# Patient Record
Sex: FEMALE | Race: WHITE | NOT HISPANIC OR LATINO | Employment: UNEMPLOYED | URBAN - NONMETROPOLITAN AREA
[De-identification: names, ages, dates, MRNs, and addresses within clinical notes are randomized per-mention and may not be internally consistent; named-entity substitution may affect disease eponyms.]

---
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
## 2023-01-19 LAB
ABO GROUP BLD: ABNORMAL
BASOPHILS # BLD AUTO: 0 X10E3/UL (ref 0–0.2)
BASOPHILS NFR BLD AUTO: 1 %
BLD GP AB SCN SERPL QL: NEGATIVE
CFDNA.FET/CFDNA.TOTAL SFR FETUS: NORMAL %
CFTR MUT ANL BLD/T: NORMAL
CITATION REF LAB TEST: NORMAL
CLINICAL GENETICS COUNSELING NOTE: ABNORMAL
CLINICAL INFO: ABNORMAL
EOSINOPHIL # BLD AUTO: 0.1 X10E3/UL (ref 0–0.4)
EOSINOPHIL NFR BLD AUTO: 2 %
ERYTHROCYTE [DISTWIDTH] IN BLOOD BY AUTOMATED COUNT: 19.8 % (ref 11.7–15.4)
ETHNIC BACKGROUND STATED: ABNORMAL
FET 13+18+21+X+Y ANEUP PLAS.CFDNA: NEGATIVE
FET CHR 21 TS PLAS.CFDNA QL: NEGATIVE
FET MS X RISK WBC.DNA+CFDNA QL: NOT DETECTED
FET SEX PLAS.CFDNA DOSAGE CFDNA: NORMAL
FET TS 13 RISK PLAS.CFDNA QL: NEGATIVE
FET TS 18 RISK WBC.DNA+CFDNA QL: NEGATIVE
FET X + Y ANEUP RISK PLAS.CFDNA SEQ-IMP: NOT DETECTED
GA EST FROM CONCEPTION DATE: NORMAL D
GESTATIONAL AGE > 9:: YES
HBV SURFACE AG SERPL QL IA: NEGATIVE
HCT VFR BLD AUTO: 29.1 % (ref 34–46.6)
HCV AB S/CO SERPL IA: <0.1 S/CO RATIO (ref 0–0.9)
HGB BLD-MCNC: 8.3 G/DL (ref 11.1–15.9)
HGB S BLD QL SOLY: NEGATIVE
HIV 1+2 AB+HIV1 P24 AG SERPL QL IA: NON REACTIVE
IMM GRANULOCYTES # BLD AUTO: 0 X10E3/UL (ref 0–0.1)
IMM GRANULOCYTES NFR BLD AUTO: 0 %
LAB DIRECTOR NAME PROVIDER: ABNORMAL
LAB DIRECTOR NAME PROVIDER: NORMAL
LAB DIRECTOR NAME PROVIDER: NORMAL
LABORATORY COMMENT REPORT: ABNORMAL
LABORATORY COMMENT REPORT: NORMAL
LABORATORY COMMENT REPORT: NORMAL
LIMITATIONS OF THE TEST: NORMAL
LYMPHOCYTES # BLD AUTO: 1.6 X10E3/UL (ref 0.7–3.1)
LYMPHOCYTES NFR BLD AUTO: 26 %
MCH RBC QN AUTO: 21.7 PG (ref 26.6–33)
MCHC RBC AUTO-ENTMCNC: 28.5 G/DL (ref 31.5–35.7)
MCV RBC AUTO: 76 FL (ref 79–97)
MONOCYTES # BLD AUTO: 0.8 X10E3/UL (ref 0.1–0.9)
MONOCYTES NFR BLD AUTO: 14 %
NEGATIVE PREDICTIVE VALUE: NORMAL
NEUTROPHILS # BLD AUTO: 3.5 X10E3/UL (ref 1.4–7)
NEUTROPHILS NFR BLD AUTO: 57 %
NOTE: NORMAL
PERFORMANCE CHARACTERISTICS: NORMAL
PLATELET # BLD AUTO: 473 X10E3/UL (ref 150–450)
POSITIVE PREDICTIVE VALUE: NORMAL
RBC # BLD AUTO: 3.83 X10E6/UL (ref 3.77–5.28)
REASON FOR REFERRAL (NARRATIVE): ABNORMAL
REF LAB TEST METHOD: ABNORMAL
REF LAB TEST METHOD: NORMAL
RH BLD: POSITIVE
RPR SER QL: NON REACTIVE
RUBV IGG SERPL IA-ACNC: 1.64 INDEX
SMN1 GENE MUT ANL BLD/T: ABNORMAL
SPECIMEN SOURCE: ABNORMAL
TEST PERFORMANCE INFO SPEC: ABNORMAL
TEST PERFORMANCE INFO SPEC: ABNORMAL
TEST PERFORMANCE INFO SPEC: NORMAL
WBC # BLD AUTO: 6.2 X10E3/UL (ref 3.4–10.8)

## 2023-01-26 ENCOUNTER — ROUTINE PRENATAL (OUTPATIENT)
Dept: OBSTETRICS AND GYNECOLOGY | Facility: CLINIC | Age: 18
End: 2023-01-26
Payer: COMMERCIAL

## 2023-01-26 VITALS — WEIGHT: 94 LBS | DIASTOLIC BLOOD PRESSURE: 54 MMHG | SYSTOLIC BLOOD PRESSURE: 102 MMHG

## 2023-01-26 DIAGNOSIS — R11.0 NAUSEA WITHOUT VOMITING: ICD-10-CM

## 2023-01-26 DIAGNOSIS — K21.9 GASTROESOPHAGEAL REFLUX DISEASE WITHOUT ESOPHAGITIS: ICD-10-CM

## 2023-01-26 DIAGNOSIS — D50.9 IRON DEFICIENCY ANEMIA DURING PREGNANCY: ICD-10-CM

## 2023-01-26 DIAGNOSIS — O09.91 ENCOUNTER FOR SUPERVISION OF HIGH RISK PREGNANCY IN FIRST TRIMESTER, ANTEPARTUM: Primary | ICD-10-CM

## 2023-01-26 DIAGNOSIS — K51.911 ULCERATIVE COLITIS WITH RECTAL BLEEDING, UNSPECIFIED LOCATION: ICD-10-CM

## 2023-01-26 DIAGNOSIS — O99.019 IRON DEFICIENCY ANEMIA DURING PREGNANCY: ICD-10-CM

## 2023-01-26 PROCEDURE — 0502F SUBSEQUENT PRENATAL CARE: CPT | Performed by: OBSTETRICS & GYNECOLOGY

## 2023-01-26 RX ORDER — FAMOTIDINE 20 MG/1
20 TABLET, FILM COATED ORAL 2 TIMES DAILY
Qty: 60 TABLET | Refills: 5 | Status: SHIPPED | OUTPATIENT
Start: 2023-01-26 | End: 2023-02-28

## 2023-01-26 RX ORDER — PROMETHAZINE HYDROCHLORIDE 25 MG/1
25 TABLET ORAL EVERY 6 HOURS PRN
Qty: 30 TABLET | Refills: 0 | Status: SHIPPED | OUTPATIENT
Start: 2023-01-26

## 2023-01-27 NOTE — PROGRESS NOTES
Chief Complaint  Routine Prenatal Visit (Patient complains of nausea. )    History of Present Illness:  Ricky is a  currently at 13w4d who presents today with no complaints other than nausea.  The patient reports it is worse at night.  She does have reflux as well.  Does have known ulcerative colitis.  She does see Dr. Zhu.   The patient did have labs last visit as noted.  She does report she is taking her prenatal vitamins and iron supplements.    Exam:  Vitals:  See prenatal flowsheet as noted and reviewed  General: Alert, cooperative, and does not appear in any distress  Abdomen:   See prenatal flowsheet as noted and reviewed    Uterus gravid, non-tender; no palpable masses    No guarding or rebound tenderness  Pelvic:  See prenatal flowsheet as noted and reviewed  Ext:  See prenatal flowsheet as noted and reviewed    Moves extremities well, no cyanosis and no redness  Urine:  See prenatal flowsheet as noted and reviewed    Data Review:  The following data was reviewed by: Kathi Ndiaye MD on 2023:  Prenatal Labs:  Lab Results   Component Value Date    HGB 8.3 (L) 2023    RUBELLAABIGG 1.64 2023    HEPBSAG Negative 2023    ABO A 2023    RH Positive 2023    ABSCRN Negative 2023    OMK6CEV3 Non Reactive 2023    HEPCVIRUSABY <0.1 2023       Initial Prenatal on 2022   Component Date Value   • Amphetamine, Urine Qual 2022 Negative    • Barbiturates Screen, Uri* 2022 Negative    • Benzodiazepine Screen, U* 2022 Negative    • THC Screen, Urine 2022 Negative    • Cocaine Screen, Urine 2022 Negative    • Opiate Screen, Urine 2022 Negative    • Oxycodone/Oxymorphone, U* 2022 Negative    • Phencyclidine (PCP), Uri* 2022 Negative    • Methadone Screen, Urine 2022 Negative    • Propoxyphene Screen 2022 Negative    • Creatinine, Urine 2022 113.1    • pH, UA 2022 5.8    • Please note  12/29/2022 Comment    • Chlamydia trachomatis, N* 12/29/2022 Negative    • Gonococcus by RAZ 12/29/2022 Negative    • Trichomonas vaginosis 12/29/2022 Negative    • Gestation 01/04/2023 Carmen    • Fetal Fraction 01/04/2023 5%    • Gestational Age >9: 01/04/2023 Yes    • Result 01/04/2023 Negative    •  Comments 01/04/2023 Comment    • Approved By 01/04/2023 Comment    • TRISOMY 21 (DOWN SYNDROM* 01/04/2023 Negative    • TRISOMY 18 (QUIROGA SYND* 01/04/2023 Negative    • TRISOMY 13 (PATAU SYNDRO* 01/04/2023 Negative    • FETAL SEX 01/04/2023 Comment    • MONOSOMY X (TRUONG SYNDR* 01/04/2023 Not Detected    • XYY (RHODES SYNDROME) 01/04/2023 Not Detected    • XXY (KLINEFELTER SYNDROM* 01/04/2023 Not Detected    • XXX (TRIPLE X SYNDROME) 01/04/2023 Not Detected    • NEGATIVE PREDICTIVE VALUE 01/04/2023 Note    • POSITIVE PREDICTIVE VALUE 01/04/2023 N/A    • About The Test 01/04/2023 Comment    • Test Method 01/04/2023 Comment    • Performance 01/04/2023 Comment    • PERFORMANCE CHARACTERIST* 01/04/2023 Note    • Limitations of the Test 01/04/2023 Comment    • Note 01/04/2023 Comment    • References 01/04/2023 Comment    • Hemoglobin (Hgb) Solubil* 01/04/2023 Negative    • Genetic Counselor: 01/04/2023 Not applicable    • Specimen Type 01/04/2023 Comment    • Ethnicity 01/04/2023 Comment    • Indication: 01/04/2023 Comment    • Result 01/04/2023 Note (A)    • General Comments: 01/04/2023 Note    • Additional Clinic Info 01/04/2023 Note    • Method/Limitations: 01/04/2023 Note    • Information Table 01/04/2023 Note    • Disclaimer: 01/04/2023 Note    • Director Review: 01/04/2023 Note    • CF, Screen 01/04/2023 Comment:    • Comment 01/04/2023 Comment    • Hepatitis B Surface Ag 01/04/2023 Negative    • Hep C Virus Ab 01/04/2023 <0.1    • RPR 01/04/2023 Non Reactive    • Rubella Antibodies, IgG 01/04/2023 1.64    • ABO Type 01/04/2023 A    • Rh Factor 01/04/2023 Positive    • Antibody Screen 01/04/2023  Negative    • HIV Screen 4th Gen w/RFX* 01/04/2023 Non Reactive    • WBC 01/04/2023 6.2    • RBC 01/04/2023 3.83    • Hemoglobin 01/04/2023 8.3 (L)    • Hematocrit 01/04/2023 29.1 (L)    • MCV 01/04/2023 76 (L)    • MCH 01/04/2023 21.7 (L)    • MCHC 01/04/2023 28.5 (L)    • RDW 01/04/2023 19.8 (H)    • Platelets 01/04/2023 473 (H)    • Neutrophil Rel % 01/04/2023 57    • Lymphocyte Rel % 01/04/2023 26    • Monocyte Rel % 01/04/2023 14    • Eosinophil Rel % 01/04/2023 2    • Basophil Rel % 01/04/2023 1    • Neutrophils Absolute 01/04/2023 3.5    • Lymphocytes Absolute 01/04/2023 1.6    • Monocytes Absolute 01/04/2023 0.8    • Eosinophils Absolute 01/04/2023 0.1    • Basophils Absolute 01/04/2023 0.0    • Immature Granulocyte Rel* 01/04/2023 0    • Immature Grans Absolute 01/04/2023 0.0      Imaging:  US Ob Transvaginal  Impression:   IUP measuring 9w4d with appropriate fetal cardiac activity.  TARAN is set at   7/31/23.  The cervix and bilateral ovaries are normal in appearance. No   free fluid in the cul-de-sac.    Lola Morgan MD   Obstetrics and Gynecology  [facility]        Medical Records:  None    Assessment and Plan:  Problem List Items Addressed This Visit    None  Visit Diagnoses     Encounter for supervision of high risk pregnancy in first trimester, antepartum    -  Primary  Topics discussed:     ab precautions  GERD management  iron supplementation  kick counts and fetal movement  I discussed with the patient the option of MSAFP.  Patient is to consider as discussed.    Ulcerative colitis with rectal bleeding, unspecified location (HCC)      Patient is to continue her iron supplements.  Prescription is also given for Pepcid for her reflux.  Prescriptions also given for Phenergan.  She is to keep her follow-up appointment with GI as discussed.    Relevant Medications    famotidine (PEPCID) 20 MG tablet    Nausea without vomiting        Relevant Medications    famotidine (PEPCID) 20 MG tablet     promethazine (PHENERGAN) 25 MG tablet    Gastroesophageal reflux disease without esophagitis        Relevant Medications    famotidine (PEPCID) 20 MG tablet    Iron deficiency anemia during pregnancy      Patient is to continue her iron supplements as discussed.        Follow Up/Instructions:  Follow up as scheduled.  Patient was given instructions and counseling regarding her condition or for health maintenance advice. Please see specific information pulled into the AVS if appropriate.     Note: Speech recognition transcription software may have been used to dictate portions of this document.  An attempt at proofreading has been made though minor errors in transcription may still be present.    This note was electronically signed.  Kathi Ndiaye M.D.

## 2023-02-27 NOTE — PROGRESS NOTES
Prenatal Care Visit    Subjective   Chief Complaint   Patient presents with   • Routine Prenatal Visit     Patient is having right side chest pain that is inconsistent.      History:   Ricky is a  currently at 18w1d who presents for a prenatal care visit today.    Reports intermittent N/V that has improved over the past week. She reports intermittent right sided chest pain for the past couple of weeks that is exacerbated by movement/ position and breathing. It is sharp in nature but lingers. She reports a longstanding intermittent cough though this does not seem to be associated with the pain. Denies VB, CTX, LOF. Reports (+) FM.     Objective   BP 94/56   Wt 44 kg (97 lb)   LMP 10/24/2022   Physical Exam:  Normal, gestational age-appropriate exam today        Assessment & Plan     1. IUP @ 18w1d  2. Routine care: I have reviewed the prenatal labs and ultrasound(s) today. I have reviewed the most recent prenatal progress note(s). Anatomy sono today - all anatomy cleared as normal. EFW 42%, AC 46%. Vertex, posterior placenta.  3. UC: denies history of fistula/ abscess. Maintained on budesonide QD and vedolizumab q8 wk infusions. Scheduled to see Dr. Mario 4/3/23.  4. Anemia: H/H 8.3/29.1, encouraged to continue iron supplement. Scheduled for iron infusions in [place].   5. SMA carrier: partner is s/p screening and was negative  6. Chest pain: likely MSK in nature, discussed warning signs/ symptoms and encouraged to report to ED with concerns     Diagnosis Plan   1. Supervision of high risk pregnancy in second trimester        2. Encounter for fetal anatomic survey  US Ob 14 + Weeks Single or First Gestation      3. Ulcerative colitis with rectal bleeding, unspecified location (HCC)        4. Iron deficiency anemia during pregnancy        5. Carrier of spinal muscular atrophy           Medication Management: Continue iron/ iron infusions, PNV    Topics discussed: Prenatal care milestones  Iron  supplementation  Kick counts and fetal movement   labor signs and symptoms   Tests next visit: none   Next visit: 4 week(s)     Lola Morgan MD  Obstetrics and Gynecology  [facility]

## 2023-02-28 ENCOUNTER — ROUTINE PRENATAL (OUTPATIENT)
Dept: OBSTETRICS AND GYNECOLOGY | Facility: CLINIC | Age: 18
End: 2023-02-28
Payer: COMMERCIAL

## 2023-02-28 VITALS — DIASTOLIC BLOOD PRESSURE: 56 MMHG | SYSTOLIC BLOOD PRESSURE: 94 MMHG | WEIGHT: 97 LBS

## 2023-02-28 DIAGNOSIS — O99.019 IRON DEFICIENCY ANEMIA DURING PREGNANCY: ICD-10-CM

## 2023-02-28 DIAGNOSIS — O09.92 SUPERVISION OF HIGH RISK PREGNANCY IN SECOND TRIMESTER: Primary | ICD-10-CM

## 2023-02-28 DIAGNOSIS — Z14.8 CARRIER OF SPINAL MUSCULAR ATROPHY: ICD-10-CM

## 2023-02-28 DIAGNOSIS — D50.9 IRON DEFICIENCY ANEMIA DURING PREGNANCY: ICD-10-CM

## 2023-02-28 DIAGNOSIS — K51.911 ULCERATIVE COLITIS WITH RECTAL BLEEDING, UNSPECIFIED LOCATION: ICD-10-CM

## 2023-02-28 DIAGNOSIS — Z36.89 ENCOUNTER FOR FETAL ANATOMIC SURVEY: ICD-10-CM

## 2023-02-28 PROCEDURE — 0502F SUBSEQUENT PRENATAL CARE: CPT | Performed by: STUDENT IN AN ORGANIZED HEALTH CARE EDUCATION/TRAINING PROGRAM

## 2023-03-30 ENCOUNTER — ROUTINE PRENATAL (OUTPATIENT)
Dept: OBSTETRICS AND GYNECOLOGY | Facility: CLINIC | Age: 18
End: 2023-03-30
Payer: COMMERCIAL

## 2023-03-30 VITALS — WEIGHT: 103 LBS | SYSTOLIC BLOOD PRESSURE: 100 MMHG | DIASTOLIC BLOOD PRESSURE: 58 MMHG

## 2023-03-30 DIAGNOSIS — O09.899 HIGH RISK TEEN PREGNANCY, ANTEPARTUM: ICD-10-CM

## 2023-03-30 DIAGNOSIS — Z34.92 PRENATAL CARE IN SECOND TRIMESTER: Primary | ICD-10-CM

## 2023-03-30 DIAGNOSIS — D64.9 ANEMIA, UNSPECIFIED TYPE: ICD-10-CM

## 2023-03-30 DIAGNOSIS — K51.819 OTHER ULCERATIVE COLITIS WITH COMPLICATION: ICD-10-CM

## 2023-03-30 PROCEDURE — 0502F SUBSEQUENT PRENATAL CARE: CPT | Performed by: OBSTETRICS & GYNECOLOGY

## 2023-03-30 RX ORDER — FERROUS SULFATE 325(65) MG
325 TABLET ORAL 2 TIMES DAILY WITH MEALS
Qty: 60 TABLET | Refills: 5 | Status: SHIPPED | OUTPATIENT
Start: 2023-03-30

## 2023-04-03 PROBLEM — K51.90 ULCERATIVE COLITIS: Status: ACTIVE | Noted: 2023-04-03

## 2023-04-03 PROBLEM — D64.9 ANEMIA: Status: ACTIVE | Noted: 2023-04-03

## 2023-04-03 PROBLEM — O09.899 HIGH RISK TEEN PREGNANCY, ANTEPARTUM: Status: ACTIVE | Noted: 2023-04-03

## 2023-04-03 NOTE — PROGRESS NOTES
Prenatal Care Visit    Subjective   Chief Complaint   Patient presents with   • Routine Prenatal Visit     Abdominal pain, concerned about iron being too low.       History:   Ricky is a  currently at 22w3d who presents for a prenatal care visit today.    No major issues.    Social History    Tobacco Use      Smoking status: Former        Types: Electronic Cigarette      Smokeless tobacco: Never      Tobacco comments: nicotine- i vaped for about a year straight, then i quit right away when i found out i was pregnant       Objective   /58   Wt 46.7 kg (103 lb)   LMP 10/24/2022   Physical Exam:  Normal, gestational age-appropriate exam today        Plan   Medical Decision Making:    I have reviewed the prenatal labs and ultrasound(s) today. I have reviewed the most recent prenatal progress note(s).    Diagnosis: Supervision of high risk pregnancy   Ulcerative colitis, stable  SMA carrier, partner is negative  Teen pregnancy  Depression, stable  Anemia   Tests/Orders/Rx today: No orders of the defined types were placed in this encounter.      Medication Management: none     Topics discussed: Prenatal care milestones  Anemia, iron, iron transfusions   Tests next visit: GCT  HgB   Next visit: 4 week(s)     Jac Bourne MD  Obstetrics and Gynecology  [facility]

## 2023-05-04 NOTE — PROGRESS NOTES
Prenatal Care Visit    Subjective   Chief Complaint   Patient presents with   • Routine Prenatal Visit     Patient states she is doing well.      History:   Ricky is a  currently at 27w4d who presents for a prenatal care visit today.    Doing well. Denies CTX, VB, LOF. Reports (+) FM.      Objective   BP 92/60   Wt 49 kg (108 lb)   LMP 10/24/2022   Physical Exam:  Normal, gestational age-appropriate exam today      Assessment & Plan     1. IUP @ 27w4d  2. Routine care: I have reviewed the prenatal labs and ultrasound(s) today. I have reviewed the most recent prenatal progress note(s). CBC, GTT today.  3. UC: maintained on budesonide QD, vedolizumab q8 wk infusions, stable  4. Anemia: has completed 8 iron infusions in [place]. Will f/u CBC today.  5. SMA carrier: partner is s/p screening and was negative     Diagnosis Plan   1. Prenatal care in second trimester  CBC (No Diff)    Gestational Screen 1 Hr ([facility])      2. Ulcerative colitis with rectal bleeding, unspecified location        3. Iron deficiency anemia during pregnancy           Medication Management: Continue PNV, iron supplement    Topics discussed: Prenatal care milestones  Iron supplementation  Kick counts and fetal movement   labor signs and symptoms   Tests next visit: none   Next visit: 3-4 week(s)     Lola Morgan MD  Obstetrics and Gynecology  [facility]

## 2023-05-05 ENCOUNTER — ROUTINE PRENATAL (OUTPATIENT)
Dept: OBSTETRICS AND GYNECOLOGY | Facility: CLINIC | Age: 18
End: 2023-05-05
Payer: COMMERCIAL

## 2023-05-05 VITALS — SYSTOLIC BLOOD PRESSURE: 92 MMHG | DIASTOLIC BLOOD PRESSURE: 60 MMHG | WEIGHT: 108 LBS

## 2023-05-05 DIAGNOSIS — Z34.92 PRENATAL CARE IN SECOND TRIMESTER: Primary | ICD-10-CM

## 2023-05-05 DIAGNOSIS — K51.911 ULCERATIVE COLITIS WITH RECTAL BLEEDING, UNSPECIFIED LOCATION: ICD-10-CM

## 2023-05-05 DIAGNOSIS — D50.9 IRON DEFICIENCY ANEMIA DURING PREGNANCY: ICD-10-CM

## 2023-05-05 DIAGNOSIS — O99.019 IRON DEFICIENCY ANEMIA DURING PREGNANCY: ICD-10-CM

## 2023-05-05 LAB
ERYTHROCYTE [DISTWIDTH] IN BLOOD BY AUTOMATED COUNT: 16.4 % (ref 12.3–15.4)
GLUCOSE 1H P 50 G GLC PO SERPL-MCNC: 69 MG/DL (ref 65–139)
HCT VFR BLD AUTO: 26.5 % (ref 34–46.6)
HGB BLD-MCNC: 8.1 G/DL (ref 12–15.9)
MCH RBC QN AUTO: 22.9 PG (ref 26.6–33)
MCHC RBC AUTO-ENTMCNC: 30.6 G/DL (ref 31.5–35.7)
MCV RBC AUTO: 74.9 FL (ref 79–97)
PLATELET # BLD AUTO: 360 10*3/MM3 (ref 140–450)
RBC # BLD AUTO: 3.54 10*6/MM3 (ref 3.77–5.28)
WBC # BLD AUTO: 6.56 10*3/MM3 (ref 3.4–10.8)

## 2023-06-02 ENCOUNTER — ROUTINE PRENATAL (OUTPATIENT)
Dept: OBSTETRICS AND GYNECOLOGY | Facility: CLINIC | Age: 18
End: 2023-06-02

## 2023-06-02 VITALS — WEIGHT: 109 LBS | SYSTOLIC BLOOD PRESSURE: 106 MMHG | DIASTOLIC BLOOD PRESSURE: 60 MMHG

## 2023-06-02 DIAGNOSIS — Z34.93 PRENATAL CARE IN THIRD TRIMESTER: Primary | ICD-10-CM

## 2023-06-02 DIAGNOSIS — D64.9 ANEMIA, UNSPECIFIED TYPE: ICD-10-CM

## 2023-06-02 DIAGNOSIS — O09.899 HIGH RISK TEEN PREGNANCY, ANTEPARTUM: ICD-10-CM

## 2023-06-02 DIAGNOSIS — K51.819 OTHER ULCERATIVE COLITIS WITH COMPLICATION: ICD-10-CM

## 2023-06-02 PROCEDURE — 0502F SUBSEQUENT PRENATAL CARE: CPT | Performed by: OBSTETRICS & GYNECOLOGY

## 2023-06-02 NOTE — PROGRESS NOTES
Prenatal Care Visit    Subjective   Chief Complaint   Patient presents with   • Routine Prenatal Visit     No Complaints/concerns        History:   Ricky is a  currently at 31w4d who presents for a prenatal care visit today.    No major issues.    Social History    Tobacco Use      Smoking status: Former        Types: Electronic Cigarette      Smokeless tobacco: Never      Tobacco comments: nicotine- i vaped for about a year straight, then i quit right away when i found out i was pregnant       Objective   /60   Wt 49.4 kg (109 lb)   LMP 10/24/2022   Physical Exam:  Normal, gestational age-appropriate exam today        Plan   Medical Decision Making:    I have reviewed the prenatal labs and ultrasound(s) today. I have reviewed the most recent prenatal progress note(s).    Diagnosis: Supervision of high risk pregnancy   Ulcerative colitis, stable  SMA carrier, partner is negative  Teen pregnancy  Depression, stable  Significant anemia   Tests/Orders/Rx today: No orders of the defined types were placed in this encounter.      Medication Management: none     Topics discussed: Prenatal care milestones  kick counts and fetal movement  PIH precautions   labor signs and symptoms  Anemia, iron, iron transfusions, blood transfusion   Tests next visit: U/S for EFW   Next visit: 1 week(s)     Jac Bourne MD  Obstetrics and Gynecology  [facility]

## 2023-06-09 ENCOUNTER — ROUTINE PRENATAL (OUTPATIENT)
Dept: OBSTETRICS AND GYNECOLOGY | Facility: CLINIC | Age: 18
End: 2023-06-09
Payer: COMMERCIAL

## 2023-06-09 VITALS — DIASTOLIC BLOOD PRESSURE: 58 MMHG | WEIGHT: 111 LBS | SYSTOLIC BLOOD PRESSURE: 104 MMHG

## 2023-06-09 DIAGNOSIS — Z34.93 THIRD TRIMESTER PREGNANCY: Primary | ICD-10-CM

## 2023-06-09 PROCEDURE — 0502F SUBSEQUENT PRENATAL CARE: CPT | Performed by: OBSTETRICS & GYNECOLOGY

## 2023-06-09 NOTE — PROGRESS NOTES
Chief Complaint   Patient presents with    Routine Prenatal Visit     Growth scan, no complaints.        HPI:   , 32w4d gestation reports doing well    ROS:  See Prenatal Episode/Flowsheet  /58   Wt 50.3 kg (111 lb)   LMP 10/24/2022      EXAM:  EXTREMITIES:  No swelling-See Prenatal Episode/Flowsheet    ABDOMEN:  FHTs/Movement noted-See Prenatal Episode/Flowsheet    URINE GLUCOSE/PROTEIN:  See Prenatal Episode/Flowsheet    PELVIC EXAM:  See Prenatal Episode/Flowsheet  CV:  Lungs:  GYN:    MDM:    Lab Results   Component Value Date    HGB 7.8 (L) 2023    RUBELLAABIGG 1.64 2023    HEPBSAG Negative 2023    ABO A 2023    RH Positive 2023    ABSCRN Negative 2023    [id number] Non Reactive 2023    HEPCVIRUSABY <0.1 2023       U/S:    1. IUP 32w4d  2. Routine care   3.  Ulcerative colitis: Stable  4.  Reassuring growth ultrasound  5.  Significant longstanding anemia nonresponsive to oral as well as intravenous iron infusions: We will transfuse patient today or early next week 2 units packed red blood cells.

## 2023-06-12 ENCOUNTER — TELEPHONE (OUTPATIENT)
Dept: OBSTETRICS AND GYNECOLOGY | Facility: CLINIC | Age: 18
End: 2023-06-12
Payer: COMMERCIAL

## 2023-06-12 DIAGNOSIS — O99.019 MATERNAL ANEMIA IN PREGNANCY, ANTEPARTUM: Primary | ICD-10-CM

## 2023-06-12 RX ORDER — SODIUM CHLORIDE 9 MG/ML
250 INJECTION, SOLUTION INTRAVENOUS AS NEEDED
Status: CANCELLED | OUTPATIENT
Start: 2023-06-12

## 2023-06-12 RX ORDER — DIPHENHYDRAMINE HCL 25 MG
25 TABLET ORAL ONCE
Status: CANCELLED | OUTPATIENT
Start: 2023-06-12 | End: 2023-06-12

## 2023-06-12 RX ORDER — ACETAMINOPHEN 325 MG/1
650 TABLET ORAL ONCE
Status: CANCELLED | OUTPATIENT
Start: 2023-06-12 | End: 2023-06-12

## 2023-06-12 NOTE — TELEPHONE ENCOUNTER
Patient called today requesting a referral for a blood transferral. Stated Dr Chanel wanted that ordered for patient.

## 2023-06-12 NOTE — TELEPHONE ENCOUNTER
Orders have been placed.  This was post to be set up on Friday and I do not know what happened.  Please call patient and ensure with outpatient transfusion that it is coordinated for sometime this week.  Thank you

## 2023-06-13 ENCOUNTER — HOSPITAL ENCOUNTER (OUTPATIENT)
Facility: HOSPITAL | Age: 18
Discharge: HOME | End: 2023-06-13
Attending: MIDWIFE | Admitting: OBSTETRICS & GYNECOLOGY
Payer: COMMERCIAL

## 2023-06-13 VITALS
WEIGHT: 112 LBS | TEMPERATURE: 97.7 F | SYSTOLIC BLOOD PRESSURE: 105 MMHG | HEIGHT: 62 IN | DIASTOLIC BLOOD PRESSURE: 66 MMHG | OXYGEN SATURATION: 98 % | BODY MASS INDEX: 20.61 KG/M2 | HEART RATE: 90 BPM | RESPIRATION RATE: 18 BRPM

## 2023-06-13 DIAGNOSIS — O99.019 MATERNAL ANEMIA IN PREGNANCY, ANTEPARTUM: ICD-10-CM

## 2023-06-13 LAB
ABO GROUP BLD: NORMAL
ABO GROUP BLD: NORMAL
BLD GP AB SCN SERPL QL: NEGATIVE
RH BLD: POSITIVE
RH BLD: POSITIVE
T&S EXPIRATION DATE: NORMAL

## 2023-06-13 PROCEDURE — P9016 RBC LEUKOCYTES REDUCED: HCPCS

## 2023-06-13 PROCEDURE — 86900 BLOOD TYPING SEROLOGIC ABO: CPT

## 2023-06-13 PROCEDURE — G0463 HOSPITAL OUTPT CLINIC VISIT: HCPCS

## 2023-06-13 PROCEDURE — 63710000001 DIPHENHYDRAMINE PER 50 MG: Performed by: OBSTETRICS & GYNECOLOGY

## 2023-06-13 PROCEDURE — 86900 BLOOD TYPING SEROLOGIC ABO: CPT | Performed by: MIDWIFE

## 2023-06-13 PROCEDURE — 86850 RBC ANTIBODY SCREEN: CPT | Performed by: MIDWIFE

## 2023-06-13 PROCEDURE — 36430 TRANSFUSION BLD/BLD COMPNT: CPT

## 2023-06-13 PROCEDURE — 86901 BLOOD TYPING SEROLOGIC RH(D): CPT

## 2023-06-13 PROCEDURE — 59025 FETAL NON-STRESS TEST: CPT

## 2023-06-13 PROCEDURE — 86901 BLOOD TYPING SEROLOGIC RH(D): CPT | Performed by: MIDWIFE

## 2023-06-13 PROCEDURE — 36415 COLL VENOUS BLD VENIPUNCTURE: CPT | Performed by: MIDWIFE

## 2023-06-13 PROCEDURE — 86920 COMPATIBILITY TEST SPIN: CPT

## 2023-06-13 RX ORDER — ACETAMINOPHEN 325 MG/1
650 TABLET ORAL ONCE
Status: COMPLETED | OUTPATIENT
Start: 2023-06-13 | End: 2023-06-13

## 2023-06-13 RX ORDER — DIPHENHYDRAMINE HCL 25 MG
25 CAPSULE ORAL ONCE
Status: COMPLETED | OUTPATIENT
Start: 2023-06-13 | End: 2023-06-13

## 2023-06-13 RX ORDER — SODIUM CHLORIDE 9 MG/ML
250 INJECTION, SOLUTION INTRAVENOUS AS NEEDED
Status: DISCONTINUED | OUTPATIENT
Start: 2023-06-13 | End: 2023-06-13 | Stop reason: HOSPADM

## 2023-06-13 RX ADMIN — ACETAMINOPHEN 650 MG: 325 TABLET, FILM COATED ORAL at 12:33

## 2023-06-13 RX ADMIN — DIPHENHYDRAMINE HYDROCHLORIDE 25 MG: 25 CAPSULE ORAL at 12:33

## 2023-06-13 NOTE — NON STRESS TEST
Triage Note - Nursing Documentation  Labor and Delivery Admission Log    Ricky Hamilton  : 2005  MRN: [id number]  CSN: [id number]    Date in / Time in:  2023  Time in:       Date out / Time out:    Time out:     Nurse: Allison Dyer, RN    Patient Info: She is a 17 y.o. year old  at 33w1d with an TARAN of 2023, by Last Menstrual Period who was seen on the [facility] [place].    Chief Complaint:   Chief Complaint   Patient presents with    Non-stress Test     SENT FROM OFFICE TO RECEIVE 2 UNITS OF BLOOD       Provider Instructions / Disposition: Discharging home.    Patient Active Problem List   Diagnosis    Anemia    Ulcerative colitis    High risk teen pregnancy, antepartum       NST Documentation (Only applicable > 32 weeks): Interpretation A  Nonstress Test Interpretation A: Reactive (23 1834 : Allison Dyer, RN)

## 2023-06-14 LAB
BH BB BLOOD EXPIRATION DATE: NORMAL
BH BB BLOOD EXPIRATION DATE: NORMAL
BH BB BLOOD TYPE BARCODE: 6200
BH BB BLOOD TYPE BARCODE: 6200
BH BB DISPENSE STATUS: NORMAL
BH BB DISPENSE STATUS: NORMAL
BH BB PRODUCT CODE: NORMAL
BH BB PRODUCT CODE: NORMAL
BH BB UNIT NUMBER: NORMAL
BH BB UNIT NUMBER: NORMAL
CROSSMATCH INTERPRETATION: NORMAL
CROSSMATCH INTERPRETATION: NORMAL
UNIT  ABO: NORMAL
UNIT  ABO: NORMAL
UNIT  RH: NORMAL
UNIT  RH: NORMAL

## 2023-06-16 ENCOUNTER — HOSPITAL ENCOUNTER (OUTPATIENT)
Facility: HOSPITAL | Age: 18
Discharge: HOME | End: 2023-06-16
Attending: OBSTETRICS & GYNECOLOGY | Admitting: OBSTETRICS & GYNECOLOGY
Payer: COMMERCIAL

## 2023-06-16 VITALS
TEMPERATURE: 98.8 F | WEIGHT: 115 LBS | HEART RATE: 81 BPM | BODY MASS INDEX: 21.16 KG/M2 | OXYGEN SATURATION: 97 % | HEIGHT: 62 IN | RESPIRATION RATE: 18 BRPM | DIASTOLIC BLOOD PRESSURE: 60 MMHG | SYSTOLIC BLOOD PRESSURE: 111 MMHG

## 2023-06-16 LAB
BILIRUB BLD-MCNC: NEGATIVE MG/DL
CLARITY, POC: CLEAR
COLOR UR: YELLOW
GLUCOSE UR STRIP-MCNC: NEGATIVE MG/DL
KETONES UR QL: NEGATIVE
LEUKOCYTE EST, POC: NEGATIVE
NITRITE UR-MCNC: NEGATIVE MG/ML
PH UR: 6.5 [PH] (ref 5–8)
PROT UR STRIP-MCNC: NEGATIVE MG/DL
RBC # UR STRIP: NEGATIVE /UL
SP GR UR: 1 (ref 1–1.03)
UROBILINOGEN UR QL: NORMAL

## 2023-06-16 PROCEDURE — 59025 FETAL NON-STRESS TEST: CPT

## 2023-06-16 PROCEDURE — 81002 URINALYSIS NONAUTO W/O SCOPE: CPT | Performed by: OBSTETRICS & GYNECOLOGY

## 2023-06-16 RX ORDER — BUTALBITAL, ACETAMINOPHEN AND CAFFEINE 50; 325; 40 MG/1; MG/1; MG/1
2 TABLET ORAL ONCE
Status: COMPLETED | OUTPATIENT
Start: 2023-06-16 | End: 2023-06-16

## 2023-06-16 RX ADMIN — BUTALBITAL, ACETAMINOPHEN, AND CAFFEINE 2 TABLET: 50; 325; 40 TABLET ORAL at 17:43

## 2023-06-16 NOTE — NON STRESS TEST
Triage Note - Nursing Documentation  Labor and Delivery Admission Log    Ricky Hamilton  : 2005  MRN: [id number]  CSN: [id number]    Date in / Time in:  2023  Time in:      Date out / Time out:    Time out:      Nurse: Jane Spivey RN    Patient Info: She is a 17 y.o. year old  at 33w4d with an TARAN of 2023, by Last Menstrual Period who [facility].    Chief Complaint:   Chief Complaint   Patient presents with    Headache     My headache wont go away.       Provider Instructions / Disposition: Pt arrived for headache, given Fioricet and it relieved headache. Pt   Abdomen soft and non tender, no bleeding, no cramping. Pt stable and ready for d/c to  home. Educated on diet, increasing PO intake,  labor s/s and fetal kick counts. Pt stable and d/c to home.    Patient Active Problem List   Diagnosis    Anemia    Ulcerative colitis    High risk teen pregnancy, antepartum       NST Documentation (Only applicable > 32 weeks): Interpretation A  Nonstress Test Interpretation A: Reactive (23 1715 : Jane Spivey, RN)

## 2023-06-19 ENCOUNTER — ROUTINE PRENATAL (OUTPATIENT)
Dept: OBSTETRICS AND GYNECOLOGY | Facility: CLINIC | Age: 18
End: 2023-06-19
Payer: COMMERCIAL

## 2023-06-19 VITALS — WEIGHT: 111 LBS | DIASTOLIC BLOOD PRESSURE: 68 MMHG | SYSTOLIC BLOOD PRESSURE: 108 MMHG | BODY MASS INDEX: 20.3 KG/M2

## 2023-06-19 DIAGNOSIS — O09.899 HIGH RISK TEEN PREGNANCY, ANTEPARTUM: Primary | ICD-10-CM

## 2023-06-19 PROCEDURE — 0502F SUBSEQUENT PRENATAL CARE: CPT | Performed by: MIDWIFE

## 2023-06-19 NOTE — PROGRESS NOTES
Chief Complaint   Patient presents with    Routine Prenatal Visit     No Complaints/concerns        HPI: Ricky is a  currently at 34w0d here for prenatal visit who reports the following:  Baby is active. She denies any cramping or ctxs. She had a blood transfusion of 2 units ;last week. She returned to  on Friday for headache.                EXAM:     Vitals:    23 1355   BP: 108/68      Abdomen:   See prenatal flowsheet as noted and reviewed, soft, nontender   Pelvic:  See prenatal flowsheet as noted and reviewed   Urine:  See prenatal flowsheet as noted and reviewed    Lab Results   Component Value Date    ABO A 2023    RH Positive 2023    ABSCRN Negative 2023       MDM:  Impression: Anemia in pregnancy   Tests done today: none   Topics discussed: kick counts and fetal movement   labor signs and symptoms  Reviewed OB labs   Tests next visit: GBS testing                RTO:                        2 weeks    This note was electronically signed.  Teresa Najera, ALISIA  2023

## 2023-07-21 ENCOUNTER — HOSPITAL ENCOUNTER (OUTPATIENT)
Facility: HOSPITAL | Age: 18
Discharge: HOME | End: 2023-07-21
Attending: STUDENT IN AN ORGANIZED HEALTH CARE EDUCATION/TRAINING PROGRAM | Admitting: STUDENT IN AN ORGANIZED HEALTH CARE EDUCATION/TRAINING PROGRAM
Payer: COMMERCIAL

## 2023-07-21 VITALS
HEART RATE: 81 BPM | BODY MASS INDEX: 21.35 KG/M2 | RESPIRATION RATE: 16 BRPM | DIASTOLIC BLOOD PRESSURE: 62 MMHG | WEIGHT: 116 LBS | OXYGEN SATURATION: 96 % | TEMPERATURE: 97.8 F | HEIGHT: 62 IN | SYSTOLIC BLOOD PRESSURE: 102 MMHG

## 2023-07-21 PROCEDURE — 81002 URINALYSIS NONAUTO W/O SCOPE: CPT | Performed by: STUDENT IN AN ORGANIZED HEALTH CARE EDUCATION/TRAINING PROGRAM

## 2023-07-21 PROCEDURE — G0463 HOSPITAL OUTPT CLINIC VISIT: HCPCS

## 2023-07-21 PROCEDURE — 59025 FETAL NON-STRESS TEST: CPT | Performed by: STUDENT IN AN ORGANIZED HEALTH CARE EDUCATION/TRAINING PROGRAM

## 2023-07-21 PROCEDURE — 59025 FETAL NON-STRESS TEST: CPT

## 2023-07-21 NOTE — NON STRESS TEST
Triage Note - Nursing Documentation  Labor and Delivery Admission Log    Ricky Hamilton  : 2005  MRN: [id number]  CSN: [id number]    Date in / Time in:  2023  Time in: 1354    Date out / Time out:    Time out: 6811    Nurse: Myah Cody RN    Patient Info: She is a 18 y.o. year old  at 38w4d with an TARAN of 2023, by Last Menstrual Period who was seen on the [facility].    Chief Complaint:   Chief Complaint   Patient presents with    Abdominal Cramping       Provider Instructions / Disposition:     Patient educated on signs of labor and fetal movement. Patient verbalized understanding to instructions and signed. Patient has scheduled appointment on 2023 in OB/GYN office with ALISIA Bowden.  Patient instructed if any change in condition; patient to return to [facility] for assessment/evaluation. Patient discharged home in stable condition. - Myah Cody RN.    Patient Active Problem List   Diagnosis    Anemia    Ulcerative colitis    High risk teen pregnancy, antepartum       NST Documentation (Only applicable > 32 weeks): Interpretation A  Nonstress Test Interpretation A: Reactive (23 1459 : Myah Cody, RN)

## 2023-07-24 ENCOUNTER — ROUTINE PRENATAL (OUTPATIENT)
Dept: OBSTETRICS AND GYNECOLOGY | Facility: CLINIC | Age: 18
End: 2023-07-24
Payer: COMMERCIAL

## 2023-07-24 VITALS — BODY MASS INDEX: 21.4 KG/M2 | SYSTOLIC BLOOD PRESSURE: 110 MMHG | DIASTOLIC BLOOD PRESSURE: 72 MMHG | WEIGHT: 117 LBS

## 2023-07-24 DIAGNOSIS — K51.819 OTHER ULCERATIVE COLITIS WITH COMPLICATION: ICD-10-CM

## 2023-07-24 DIAGNOSIS — O99.019 MATERNAL ANEMIA IN PREGNANCY, ANTEPARTUM: ICD-10-CM

## 2023-07-24 DIAGNOSIS — O47.9 IRREGULAR UTERINE CONTRACTIONS: ICD-10-CM

## 2023-07-24 DIAGNOSIS — O09.93 ENCOUNTER FOR SUPERVISION OF HIGH RISK PREGNANCY IN THIRD TRIMESTER, ANTEPARTUM: Primary | ICD-10-CM

## 2023-07-24 PROCEDURE — 0502F SUBSEQUENT PRENATAL CARE: CPT | Performed by: OBSTETRICS & GYNECOLOGY

## 2023-07-24 NOTE — PROGRESS NOTES
Chief Complaint  Routine Prenatal Visit (No complaints)    History of Present Illness:  Ricky is a  currently at 39w0d who presents today with no significant complaints.  She does report good fetal movement.  She has had occasional contractions.  She denies any vaginal bleeding or leaking any fluid.  She has been taking her prenatal vitamins as well as iron supplements.  She has not had a flareup of her ulcerative colitis.  She is not desiring an induction of labor unless necessary.    Exam:  Vitals:  See prenatal flowsheet as noted and reviewed  General: Alert, cooperative, and does not appear in any distress  Abdomen:   See prenatal flowsheet as noted and reviewed    Uterus gravid, non-tender; no palpable masses    No guarding or rebound tenderness  Pelvic:  See prenatal flowsheet as noted and reviewed    Vaginal exam fingertip/50%/-3  Ext:  See prenatal flowsheet as noted and reviewed    Moves extremities well, no cyanosis and no redness  Urine:  See prenatal flowsheet as noted and reviewed    Data Review:  The following data was reviewed by: Kathi Ndiaye MD on 2023:  Prenatal Labs:  Lab Results   Component Value Date    HGB 10.6 (L) 2023    RUBELLAABIGG 1.64 2023    HEPBSAG Negative 2023    ABO A 2023    RH Positive 2023    ABSCRN Negative 2023    [id number] Non Reactive 2023    HEPCVIRUSABY <0.1 2023    STREPGPB Negative 2023       Admission on 2023, Discharged on 2023   Component Date Value    Color 2023 Yellow     Clarity, UA 2023 Clear     Glucose, UA 2023 Negative     Bilirubin 2023 Negative     Ketones, UA 2023 Negative     Specific Gravity  2023 1.005     Blood, UA 2023 Negative     pH, Urine 2023 6.5     Protein, POC 2023 Negative     Urobilinogen, UA 2023 Normal     Leukocytes 2023 Negative     Nitrite, UA 2023 Negative    Lab on 2023   Component Date  Value    C-Reactive Protein 07/11/2023 0.67 (H)     WBC 07/11/2023 7.52     RBC 07/11/2023 4.41     Hemoglobin 07/11/2023 10.6 (L)     Hematocrit 07/11/2023 32.8 (L)     MCV 07/11/2023 74.4 (L)     MCH 07/11/2023 24.0 (L)     MCHC 07/11/2023 32.3     RDW 07/11/2023 19.8 (H)     RDW-SD 07/11/2023 51.9     MPV 07/11/2023 10.6     Platelets 07/11/2023 301     nRBC 07/11/2023 0.0     Glucose 07/11/2023 73     BUN 07/11/2023 5 (L)     Creatinine 07/11/2023 0.50 (L)     Sodium 07/11/2023 139     Potassium 07/11/2023 3.9     Chloride 07/11/2023 105     CO2 07/11/2023 20.3 (L)     Calcium 07/11/2023 9.0     Total Protein 07/11/2023 6.9     Albumin 07/11/2023 3.4 (L)     ALT (SGPT) 07/11/2023 8     AST (SGOT) 07/11/2023 15     Alkaline Phosphatase 07/11/2023 173 (H)     Total Bilirubin 07/11/2023 0.3     Globulin 07/11/2023 3.5     A/G Ratio 07/11/2023 1.0     BUN/Creatinine Ratio 07/11/2023 10.0     Anion Gap 07/11/2023 13.7     eGFR 07/11/2023 138.8     Neutrophil % 07/11/2023 81.0 (H)     Lymphocyte % 07/11/2023 12.0 (L)     Monocyte % 07/11/2023 6.0     Eosinophil % 07/11/2023 1.0     Neutrophils Absolute 07/11/2023 6.09     Lymphocytes Absolute 07/11/2023 0.90     Monocytes Absolute 07/11/2023 0.45     Eosinophils Absolute 07/11/2023 0.08     RBC Morphology 07/11/2023 Normal     WBC Morphology 07/11/2023 Normal     Platelet Morphology 07/11/2023 Normal    Routine Prenatal on 07/03/2023   Component Date Value    Strep Gp B Culture 07/03/2023 Negative      Imaging:  US Ob Follow Up Transabdominal Approach  Overall growth 52.3 percentile.  MADISON 14.26.  Vertex.  Posterior placenta.    Active fetus.     Medical Records:  None    Assessment and Plan:  Problem List Items Addressed This Visit          Other    Ulcerative colitis     Other Visit Diagnoses       Encounter for supervision of high risk pregnancy in third trimester, antepartum    -  Primary  Topics discussed:     GERD management  induction of labor  iron  supplementation  kick counts and fetal movement  labor signs and symptoms  PIH precautions  Scan for growth next visit if undelivered    Maternal anemia in pregnancy, antepartum      Patient is to continue her iron supplements previously given.    Irregular uterine contractions      Labor precautions and instructions have been given.          Follow Up/Instructions:  Follow up as scheduled.  Patient was given instructions and counseling regarding her condition or for health maintenance advice. Please see specific information pulled into the AVS if appropriate.     Note: Speech recognition transcription software may have been used to dictate portions of this document.  An attempt at proofreading has been made though minor errors in transcription may still be present.    This note was electronically signed.  Kathi Ndiaye M.D.

## 2023-07-29 ENCOUNTER — HOSPITAL ENCOUNTER (OUTPATIENT)
Facility: HOSPITAL | Age: 18
Discharge: HOME | End: 2023-07-29
Attending: MIDWIFE | Admitting: MIDWIFE
Payer: COMMERCIAL

## 2023-07-29 VITALS
OXYGEN SATURATION: 97 % | DIASTOLIC BLOOD PRESSURE: 69 MMHG | WEIGHT: 117.2 LBS | HEART RATE: 77 BPM | SYSTOLIC BLOOD PRESSURE: 91 MMHG | TEMPERATURE: 98.1 F | BODY MASS INDEX: 21.57 KG/M2 | RESPIRATION RATE: 18 BRPM | HEIGHT: 62 IN

## 2023-07-29 LAB
A1 MICROGLOB PLACENTAL VAG QL: NEGATIVE
BILIRUB BLD-MCNC: NEGATIVE MG/DL
CLARITY, POC: CLEAR
COLOR UR: YELLOW
GLUCOSE UR STRIP-MCNC: NEGATIVE MG/DL
KETONES UR QL: NEGATIVE
LEUKOCYTE EST, POC: NEGATIVE
NITRITE UR-MCNC: NEGATIVE MG/ML
PH UR: 6 [PH] (ref 5–8)
PROT UR STRIP-MCNC: NEGATIVE MG/DL
RBC # UR STRIP: NEGATIVE /UL
SP GR UR: 1.01 (ref 1–1.03)
UROBILINOGEN UR QL: NORMAL

## 2023-07-29 PROCEDURE — G0463 HOSPITAL OUTPT CLINIC VISIT: HCPCS

## 2023-07-29 PROCEDURE — 59025 FETAL NON-STRESS TEST: CPT

## 2023-07-29 PROCEDURE — 84112 EVAL AMNIOTIC FLUID PROTEIN: CPT | Performed by: MIDWIFE

## 2023-07-29 PROCEDURE — 81002 URINALYSIS NONAUTO W/O SCOPE: CPT | Performed by: MIDWIFE

## 2023-07-29 NOTE — NON STRESS TEST
Triage Note - Nursing Documentation  Labor and Delivery Admission Log    Ricky Hamilton  : 2005  MRN: [id number]  CSN: [id number]    Date in / Time in:  2023  Time in: 1623    Date out / Time out:    Time out: 1704    Nurse: Myah Cody RN    Patient Info: She is a 18 y.o. year old  at 39w5d with an TARAN of 2023, by Last Menstrual Period who was seen on the [facility].    Chief Complaint:   Chief Complaint   Patient presents with    Rupture of Membranes     Patient thinks her membranes have ruptured       Provider Instructions / Disposition:     Patient educated on fetal movement and signs/symptoms of labor. Patient verbalized understanding to instructions and signed. Patient has scheduled appointment in OB/GYN office on 23. Patient instructed if any change in condition; patient to return to [facility] for assessment/evaluation.  Patient discharged home in stable condition. - Myah oCdy RN.    Patient Active Problem List   Diagnosis    Anemia    Ulcerative colitis    High risk teen pregnancy, antepartum       NST Documentation (Only applicable > 32 weeks): Interpretation A  Nonstress Test Interpretation A: Reactive (23 1703 : Myah Cody, RN)

## 2023-07-31 ENCOUNTER — ROUTINE PRENATAL (OUTPATIENT)
Dept: OBSTETRICS AND GYNECOLOGY | Facility: CLINIC | Age: 18
End: 2023-07-31
Payer: COMMERCIAL

## 2023-07-31 ENCOUNTER — PREP FOR SURGERY (OUTPATIENT)
Dept: OTHER | Facility: HOSPITAL | Age: 18
End: 2023-07-31
Payer: COMMERCIAL

## 2023-07-31 VITALS — WEIGHT: 116 LBS | SYSTOLIC BLOOD PRESSURE: 104 MMHG | BODY MASS INDEX: 21.22 KG/M2 | DIASTOLIC BLOOD PRESSURE: 60 MMHG

## 2023-07-31 DIAGNOSIS — O99.019 MATERNAL ANEMIA IN PREGNANCY, ANTEPARTUM: Primary | ICD-10-CM

## 2023-07-31 DIAGNOSIS — Z34.90 ENCOUNTER FOR INDUCTION OF LABOR: Primary | ICD-10-CM

## 2023-07-31 RX ORDER — OXYTOCIN/0.9 % SODIUM CHLORIDE 30/500 ML
999 PLASTIC BAG, INJECTION (ML) INTRAVENOUS ONCE
Status: CANCELLED | OUTPATIENT
Start: 2023-07-31 | End: 2023-07-31

## 2023-07-31 RX ORDER — PROMETHAZINE HYDROCHLORIDE 12.5 MG/1
12.5 TABLET ORAL EVERY 6 HOURS PRN
Status: CANCELLED | OUTPATIENT
Start: 2023-07-31

## 2023-07-31 RX ORDER — HYDROXYZINE HYDROCHLORIDE 25 MG/1
50 TABLET, FILM COATED ORAL NIGHTLY PRN
Status: CANCELLED | OUTPATIENT
Start: 2023-07-31

## 2023-07-31 RX ORDER — SODIUM CHLORIDE, SODIUM LACTATE, POTASSIUM CHLORIDE, CALCIUM CHLORIDE 600; 310; 30; 20 MG/100ML; MG/100ML; MG/100ML; MG/100ML
125 INJECTION, SOLUTION INTRAVENOUS CONTINUOUS
Status: CANCELLED | OUTPATIENT
Start: 2023-07-31

## 2023-07-31 RX ORDER — ONDANSETRON 4 MG/1
4 TABLET, FILM COATED ORAL EVERY 6 HOURS PRN
Status: CANCELLED | OUTPATIENT
Start: 2023-07-31

## 2023-07-31 RX ORDER — ACETAMINOPHEN 325 MG/1
650 TABLET ORAL ONCE AS NEEDED
Status: CANCELLED | OUTPATIENT
Start: 2023-07-31

## 2023-07-31 RX ORDER — HYDROCODONE BITARTRATE AND ACETAMINOPHEN 5; 325 MG/1; MG/1
1 TABLET ORAL EVERY 4 HOURS PRN
Status: CANCELLED | OUTPATIENT
Start: 2023-07-31 | End: 2023-08-07

## 2023-07-31 RX ORDER — OXYTOCIN/0.9 % SODIUM CHLORIDE 30/500 ML
250 PLASTIC BAG, INJECTION (ML) INTRAVENOUS CONTINUOUS
Status: CANCELLED | OUTPATIENT
Start: 2023-07-31 | End: 2023-07-31

## 2023-07-31 RX ORDER — MISOPROSTOL 200 UG/1
800 TABLET ORAL ONCE AS NEEDED
Status: CANCELLED | OUTPATIENT
Start: 2023-07-31 | End: 2023-08-01

## 2023-07-31 RX ORDER — OXYTOCIN/0.9 % SODIUM CHLORIDE 30/500 ML
1-20 PLASTIC BAG, INJECTION (ML) INTRAVENOUS
Status: CANCELLED | OUTPATIENT
Start: 2023-07-31

## 2023-07-31 RX ORDER — MORPHINE SULFATE 2 MG/ML
2 INJECTION, SOLUTION INTRAMUSCULAR; INTRAVENOUS ONCE AS NEEDED
Status: CANCELLED | OUTPATIENT
Start: 2023-07-31

## 2023-07-31 RX ORDER — PROMETHAZINE HYDROCHLORIDE 12.5 MG/1
12.5 SUPPOSITORY RECTAL EVERY 6 HOURS PRN
Status: CANCELLED | OUTPATIENT
Start: 2023-07-31

## 2023-07-31 RX ORDER — MAGNESIUM CARB/ALUMINUM HYDROX 105-160MG
30 TABLET,CHEWABLE ORAL ONCE
Status: CANCELLED | OUTPATIENT
Start: 2023-07-31 | End: 2023-07-31

## 2023-07-31 RX ORDER — ONDANSETRON 2 MG/ML
4 INJECTION INTRAMUSCULAR; INTRAVENOUS ONCE AS NEEDED
Status: CANCELLED | OUTPATIENT
Start: 2023-07-31

## 2023-07-31 RX ORDER — ONDANSETRON 2 MG/ML
4 INJECTION INTRAMUSCULAR; INTRAVENOUS EVERY 6 HOURS PRN
Status: CANCELLED | OUTPATIENT
Start: 2023-07-31

## 2023-07-31 RX ORDER — ONDANSETRON 4 MG/1
4 TABLET, FILM COATED ORAL ONCE AS NEEDED
Status: CANCELLED | OUTPATIENT
Start: 2023-07-31

## 2023-07-31 RX ORDER — CARBOPROST TROMETHAMINE 250 UG/ML
250 INJECTION, SOLUTION INTRAMUSCULAR ONCE AS NEEDED
Status: CANCELLED | OUTPATIENT
Start: 2023-07-31 | End: 2023-08-01

## 2023-07-31 RX ORDER — METHYLERGONOVINE MALEATE 0.2 MG/ML
200 INJECTION INTRAVENOUS ONCE AS NEEDED
Status: CANCELLED | OUTPATIENT
Start: 2023-07-31 | End: 2023-08-01

## 2023-08-02 ENCOUNTER — HOSPITAL ENCOUNTER (OUTPATIENT)
Dept: LABOR AND DELIVERY | Facility: HOSPITAL | Age: 18
Discharge: HOME | End: 2023-08-02
Payer: COMMERCIAL

## 2023-08-02 ENCOUNTER — HOSPITAL ENCOUNTER (INPATIENT)
Facility: HOSPITAL | Age: 18
LOS: 3 days | Discharge: HOME | End: 2023-08-05
Attending: OBSTETRICS & GYNECOLOGY | Admitting: MIDWIFE
Payer: COMMERCIAL

## 2023-08-02 DIAGNOSIS — Z34.90 ENCOUNTER FOR INDUCTION OF LABOR: ICD-10-CM

## 2023-08-02 LAB
ABO GROUP BLD: NORMAL
ANISOCYTOSIS BLD QL: NORMAL
BASOPHILS # BLD AUTO: 0.02 10*3/MM3 (ref 0–0.2)
BASOPHILS NFR BLD AUTO: 0.3 % (ref 0–1.5)
BILIRUB BLD-MCNC: NEGATIVE MG/DL
BLD GP AB SCN SERPL QL: NEGATIVE
CLARITY, POC: CLEAR
COLOR UR: YELLOW
DEPRECATED RDW RBC AUTO: 59.3 FL (ref 37–54)
EOSINOPHIL # BLD AUTO: 0.07 10*3/MM3 (ref 0–0.4)
EOSINOPHIL NFR BLD AUTO: 0.9 % (ref 0.3–6.2)
ERYTHROCYTE [DISTWIDTH] IN BLOOD BY AUTOMATED COUNT: 21.9 % (ref 12.3–15.4)
GLUCOSE UR STRIP-MCNC: NEGATIVE MG/DL
HCT VFR BLD AUTO: 32.5 % (ref 34–46.6)
HGB BLD-MCNC: 10.7 G/DL (ref 12–15.9)
IMM GRANULOCYTES # BLD AUTO: 0.01 10*3/MM3 (ref 0–0.05)
IMM GRANULOCYTES NFR BLD AUTO: 0.1 % (ref 0–0.5)
KETONES UR QL: NEGATIVE
LEUKOCYTE EST, POC: ABNORMAL
LYMPHOCYTES # BLD AUTO: 2.05 10*3/MM3 (ref 0.7–3.1)
LYMPHOCYTES NFR BLD AUTO: 25.8 % (ref 19.6–45.3)
MCH RBC QN AUTO: 25.1 PG (ref 26.6–33)
MCHC RBC AUTO-ENTMCNC: 32.9 G/DL (ref 31.5–35.7)
MCV RBC AUTO: 76.1 FL (ref 79–97)
MONOCYTES # BLD AUTO: 0.57 10*3/MM3 (ref 0.1–0.9)
MONOCYTES NFR BLD AUTO: 7.2 % (ref 5–12)
NEUTROPHILS NFR BLD AUTO: 5.22 10*3/MM3 (ref 1.7–7)
NEUTROPHILS NFR BLD AUTO: 65.7 % (ref 42.7–76)
NITRITE UR-MCNC: NEGATIVE MG/ML
NRBC BLD AUTO-RTO: 0 /100 WBC (ref 0–0.2)
PH UR: 6 [PH] (ref 5–8)
PLATELET # BLD AUTO: 300 10*3/MM3 (ref 140–450)
PMV BLD AUTO: 9.6 FL (ref 6–12)
PROT UR STRIP-MCNC: ABNORMAL MG/DL
RBC # BLD AUTO: 4.27 10*6/MM3 (ref 3.77–5.28)
RBC # UR STRIP: NEGATIVE /UL
RH BLD: POSITIVE
SMALL PLATELETS BLD QL SMEAR: ADEQUATE
SP GR UR: 1.02 (ref 1–1.03)
T&S EXPIRATION DATE: NORMAL
UROBILINOGEN UR QL: NORMAL
WBC MORPH BLD: NORMAL
WBC NRBC COR # BLD: 7.94 10*3/MM3 (ref 3.4–10.8)

## 2023-08-02 PROCEDURE — 81002 URINALYSIS NONAUTO W/O SCOPE: CPT | Performed by: MIDWIFE

## 2023-08-02 PROCEDURE — 86850 RBC ANTIBODY SCREEN: CPT | Performed by: MIDWIFE

## 2023-08-02 PROCEDURE — 59025 FETAL NON-STRESS TEST: CPT

## 2023-08-02 PROCEDURE — 86900 BLOOD TYPING SEROLOGIC ABO: CPT | Performed by: MIDWIFE

## 2023-08-02 PROCEDURE — G0463 HOSPITAL OUTPT CLINIC VISIT: HCPCS

## 2023-08-02 PROCEDURE — 59025 FETAL NON-STRESS TEST: CPT | Performed by: OBSTETRICS & GYNECOLOGY

## 2023-08-02 PROCEDURE — 85025 COMPLETE CBC W/AUTO DIFF WBC: CPT | Performed by: MIDWIFE

## 2023-08-02 PROCEDURE — 85007 BL SMEAR W/DIFF WBC COUNT: CPT | Performed by: MIDWIFE

## 2023-08-02 PROCEDURE — 86901 BLOOD TYPING SEROLOGIC RH(D): CPT | Performed by: MIDWIFE

## 2023-08-02 RX ORDER — SODIUM CHLORIDE, SODIUM LACTATE, POTASSIUM CHLORIDE, CALCIUM CHLORIDE 600; 310; 30; 20 MG/100ML; MG/100ML; MG/100ML; MG/100ML
125 INJECTION, SOLUTION INTRAVENOUS CONTINUOUS
Status: DISCONTINUED | OUTPATIENT
Start: 2023-08-02 | End: 2023-08-03

## 2023-08-02 RX ORDER — MAGNESIUM CARB/ALUMINUM HYDROX 105-160MG
30 TABLET,CHEWABLE ORAL ONCE
Status: COMPLETED | OUTPATIENT
Start: 2023-08-02 | End: 2023-08-03

## 2023-08-02 RX ORDER — ONDANSETRON 4 MG/1
4 TABLET, FILM COATED ORAL EVERY 6 HOURS PRN
Status: DISCONTINUED | OUTPATIENT
Start: 2023-08-02 | End: 2023-08-03

## 2023-08-02 RX ORDER — MORPHINE SULFATE 2 MG/ML
6 INJECTION, SOLUTION INTRAMUSCULAR; INTRAVENOUS EVERY 4 HOURS PRN
Status: DISCONTINUED | OUTPATIENT
Start: 2023-08-02 | End: 2023-08-03

## 2023-08-02 RX ORDER — PROMETHAZINE HYDROCHLORIDE 12.5 MG/1
12.5 SUPPOSITORY RECTAL EVERY 6 HOURS PRN
Status: DISCONTINUED | OUTPATIENT
Start: 2023-08-02 | End: 2023-08-03

## 2023-08-02 RX ORDER — OXYTOCIN/0.9 % SODIUM CHLORIDE 30/500 ML
1-20 PLASTIC BAG, INJECTION (ML) INTRAVENOUS
Status: DISCONTINUED | OUTPATIENT
Start: 2023-08-02 | End: 2023-08-03

## 2023-08-02 RX ORDER — MORPHINE SULFATE 4 MG/ML
4 INJECTION, SOLUTION INTRAMUSCULAR; INTRAVENOUS EVERY 4 HOURS PRN
Status: DISCONTINUED | OUTPATIENT
Start: 2023-08-02 | End: 2023-08-03

## 2023-08-02 RX ORDER — HYDROXYZINE HYDROCHLORIDE 25 MG/1
50 TABLET, FILM COATED ORAL NIGHTLY PRN
Status: DISCONTINUED | OUTPATIENT
Start: 2023-08-02 | End: 2023-08-03

## 2023-08-02 RX ORDER — PROMETHAZINE HYDROCHLORIDE 12.5 MG/1
12.5 TABLET ORAL EVERY 6 HOURS PRN
Status: DISCONTINUED | OUTPATIENT
Start: 2023-08-02 | End: 2023-08-03

## 2023-08-02 RX ORDER — ONDANSETRON 2 MG/ML
4 INJECTION INTRAMUSCULAR; INTRAVENOUS EVERY 6 HOURS PRN
Status: DISCONTINUED | OUTPATIENT
Start: 2023-08-02 | End: 2023-08-03

## 2023-08-02 RX ADMIN — SODIUM CHLORIDE, POTASSIUM CHLORIDE, SODIUM LACTATE AND CALCIUM CHLORIDE 125 ML/HR: 600; 310; 30; 20 INJECTION, SOLUTION INTRAVENOUS at 21:37

## 2023-08-02 RX ADMIN — Medication 1 MILLI-UNITS/MIN: at 22:27

## 2023-08-03 ENCOUNTER — ANESTHESIA EVENT (OUTPATIENT)
Dept: LABOR AND DELIVERY | Facility: HOSPITAL | Age: 18
End: 2023-08-03
Payer: COMMERCIAL

## 2023-08-03 ENCOUNTER — ANESTHESIA (OUTPATIENT)
Dept: LABOR AND DELIVERY | Facility: HOSPITAL | Age: 18
End: 2023-08-03
Payer: COMMERCIAL

## 2023-08-03 PROCEDURE — 0KQM0ZZ REPAIR PERINEUM MUSCLE, OPEN APPROACH: ICD-10-PCS | Performed by: MIDWIFE

## 2023-08-03 PROCEDURE — 25010000002 MORPHINE PER 10 MG: Performed by: MIDWIFE

## 2023-08-03 PROCEDURE — 0UQMXZZ REPAIR VULVA, EXTERNAL APPROACH: ICD-10-PCS | Performed by: MIDWIFE

## 2023-08-03 PROCEDURE — 59400 OBSTETRICAL CARE: CPT | Performed by: MIDWIFE

## 2023-08-03 PROCEDURE — C1755 CATHETER, INTRASPINAL: HCPCS | Performed by: NURSE ANESTHETIST, CERTIFIED REGISTERED

## 2023-08-03 PROCEDURE — 3E033VJ INTRODUCTION OF OTHER HORMONE INTO PERIPHERAL VEIN, PERCUTANEOUS APPROACH: ICD-10-PCS | Performed by: OBSTETRICS & GYNECOLOGY

## 2023-08-03 RX ORDER — METHYLERGONOVINE MALEATE 0.2 MG/ML
200 INJECTION INTRAVENOUS ONCE AS NEEDED
Status: DISCONTINUED | OUTPATIENT
Start: 2023-08-03 | End: 2023-08-03 | Stop reason: HOSPADM

## 2023-08-03 RX ORDER — HYDROCODONE BITARTRATE AND ACETAMINOPHEN 10; 325 MG/1; MG/1
1 TABLET ORAL EVERY 4 HOURS PRN
Status: DISCONTINUED | OUTPATIENT
Start: 2023-08-03 | End: 2023-08-05 | Stop reason: HOSPADM

## 2023-08-03 RX ORDER — OXYTOCIN/0.9 % SODIUM CHLORIDE 30/500 ML
250 PLASTIC BAG, INJECTION (ML) INTRAVENOUS CONTINUOUS
Status: ACTIVE | OUTPATIENT
Start: 2023-08-03 | End: 2023-08-03

## 2023-08-03 RX ORDER — ONDANSETRON 2 MG/ML
4 INJECTION INTRAMUSCULAR; INTRAVENOUS ONCE AS NEEDED
Status: DISCONTINUED | OUTPATIENT
Start: 2023-08-03 | End: 2023-08-03 | Stop reason: HOSPADM

## 2023-08-03 RX ORDER — DOCUSATE SODIUM 100 MG/1
100 CAPSULE, LIQUID FILLED ORAL 2 TIMES DAILY PRN
Status: DISCONTINUED | OUTPATIENT
Start: 2023-08-03 | End: 2023-08-05 | Stop reason: HOSPADM

## 2023-08-03 RX ORDER — FERROUS SULFATE TAB EC 324 MG (65 MG FE EQUIVALENT) 324 (65 FE) MG
324 TABLET DELAYED RESPONSE ORAL 2 TIMES DAILY WITH MEALS
Status: DISCONTINUED | OUTPATIENT
Start: 2023-08-03 | End: 2023-08-05 | Stop reason: HOSPADM

## 2023-08-03 RX ORDER — MORPHINE SULFATE 2 MG/ML
2 INJECTION, SOLUTION INTRAMUSCULAR; INTRAVENOUS ONCE AS NEEDED
Status: DISCONTINUED | OUTPATIENT
Start: 2023-08-03 | End: 2023-08-03 | Stop reason: HOSPADM

## 2023-08-03 RX ORDER — HYDROCODONE BITARTRATE AND ACETAMINOPHEN 5; 325 MG/1; MG/1
1 TABLET ORAL EVERY 4 HOURS PRN
Status: DISCONTINUED | OUTPATIENT
Start: 2023-08-03 | End: 2023-08-03 | Stop reason: HOSPADM

## 2023-08-03 RX ORDER — TRISODIUM CITRATE DIHYDRATE AND CITRIC ACID MONOHYDRATE 500; 334 MG/5ML; MG/5ML
30 SOLUTION ORAL ONCE
Status: DISCONTINUED | OUTPATIENT
Start: 2023-08-03 | End: 2023-08-03

## 2023-08-03 RX ORDER — ONDANSETRON 2 MG/ML
4 INJECTION INTRAMUSCULAR; INTRAVENOUS ONCE AS NEEDED
Status: DISCONTINUED | OUTPATIENT
Start: 2023-08-03 | End: 2023-08-03

## 2023-08-03 RX ORDER — ONDANSETRON 2 MG/ML
4 INJECTION INTRAMUSCULAR; INTRAVENOUS EVERY 6 HOURS PRN
Status: DISCONTINUED | OUTPATIENT
Start: 2023-08-03 | End: 2023-08-05 | Stop reason: HOSPADM

## 2023-08-03 RX ORDER — MORPHINE SULFATE 4 MG/ML
4 INJECTION, SOLUTION INTRAMUSCULAR; INTRAVENOUS ONCE AS NEEDED
Status: DISCONTINUED | OUTPATIENT
Start: 2023-08-03 | End: 2023-08-03 | Stop reason: HOSPADM

## 2023-08-03 RX ORDER — CARBOPROST TROMETHAMINE 250 UG/ML
250 INJECTION, SOLUTION INTRAMUSCULAR ONCE AS NEEDED
Status: DISCONTINUED | OUTPATIENT
Start: 2023-08-03 | End: 2023-08-03 | Stop reason: HOSPADM

## 2023-08-03 RX ORDER — ACETAMINOPHEN 325 MG/1
650 TABLET ORAL EVERY 6 HOURS PRN
Status: DISCONTINUED | OUTPATIENT
Start: 2023-08-03 | End: 2023-08-05 | Stop reason: HOSPADM

## 2023-08-03 RX ORDER — PRENATAL VIT/IRON FUM/FOLIC AC 27MG-0.8MG
1 TABLET ORAL DAILY
Status: DISCONTINUED | OUTPATIENT
Start: 2023-08-04 | End: 2023-08-05 | Stop reason: HOSPADM

## 2023-08-03 RX ORDER — ACETAMINOPHEN 325 MG/1
650 TABLET ORAL ONCE AS NEEDED
Status: DISCONTINUED | OUTPATIENT
Start: 2023-08-03 | End: 2023-08-03 | Stop reason: HOSPADM

## 2023-08-03 RX ORDER — PROMETHAZINE HYDROCHLORIDE 25 MG/1
25 TABLET ORAL EVERY 6 HOURS PRN
Status: DISCONTINUED | OUTPATIENT
Start: 2023-08-03 | End: 2023-08-05 | Stop reason: HOSPADM

## 2023-08-03 RX ORDER — SODIUM CHLORIDE 0.9 % (FLUSH) 0.9 %
1-10 SYRINGE (ML) INJECTION AS NEEDED
Status: DISCONTINUED | OUTPATIENT
Start: 2023-08-03 | End: 2023-08-05 | Stop reason: HOSPADM

## 2023-08-03 RX ORDER — DIPHENHYDRAMINE HCL 25 MG
25 CAPSULE ORAL NIGHTLY PRN
Status: DISCONTINUED | OUTPATIENT
Start: 2023-08-03 | End: 2023-08-05 | Stop reason: HOSPADM

## 2023-08-03 RX ORDER — IBUPROFEN 600 MG/1
600 TABLET ORAL EVERY 6 HOURS PRN
Status: DISCONTINUED | OUTPATIENT
Start: 2023-08-03 | End: 2023-08-05 | Stop reason: HOSPADM

## 2023-08-03 RX ORDER — PROMETHAZINE HYDROCHLORIDE 12.5 MG/1
12.5 SUPPOSITORY RECTAL EVERY 6 HOURS PRN
Status: DISCONTINUED | OUTPATIENT
Start: 2023-08-03 | End: 2023-08-05 | Stop reason: HOSPADM

## 2023-08-03 RX ORDER — EPHEDRINE SULFATE 5 MG/ML
5 INJECTION INTRAVENOUS
Status: DISCONTINUED | OUTPATIENT
Start: 2023-08-03 | End: 2023-08-03

## 2023-08-03 RX ORDER — ONDANSETRON 4 MG/1
4 TABLET, FILM COATED ORAL ONCE AS NEEDED
Status: DISCONTINUED | OUTPATIENT
Start: 2023-08-03 | End: 2023-08-03 | Stop reason: HOSPADM

## 2023-08-03 RX ORDER — PROMETHAZINE HYDROCHLORIDE 12.5 MG/1
12.5 TABLET ORAL EVERY 6 HOURS PRN
Status: DISCONTINUED | OUTPATIENT
Start: 2023-08-03 | End: 2023-08-03 | Stop reason: HOSPADM

## 2023-08-03 RX ORDER — MISOPROSTOL 200 UG/1
800 TABLET ORAL ONCE AS NEEDED
Status: DISCONTINUED | OUTPATIENT
Start: 2023-08-03 | End: 2023-08-03 | Stop reason: HOSPADM

## 2023-08-03 RX ORDER — HYDROCODONE BITARTRATE AND ACETAMINOPHEN 5; 325 MG/1; MG/1
1 TABLET ORAL EVERY 4 HOURS PRN
Status: DISCONTINUED | OUTPATIENT
Start: 2023-08-03 | End: 2023-08-05 | Stop reason: HOSPADM

## 2023-08-03 RX ORDER — BISACODYL 10 MG
10 SUPPOSITORY, RECTAL RECTAL DAILY PRN
Status: DISCONTINUED | OUTPATIENT
Start: 2023-08-04 | End: 2023-08-05 | Stop reason: HOSPADM

## 2023-08-03 RX ORDER — OXYTOCIN/0.9 % SODIUM CHLORIDE 30/500 ML
999 PLASTIC BAG, INJECTION (ML) INTRAVENOUS ONCE
Status: DISCONTINUED | OUTPATIENT
Start: 2023-08-03 | End: 2023-08-05 | Stop reason: HOSPADM

## 2023-08-03 RX ORDER — ONDANSETRON 4 MG/1
4 TABLET, FILM COATED ORAL EVERY 8 HOURS PRN
Status: DISCONTINUED | OUTPATIENT
Start: 2023-08-03 | End: 2023-08-05 | Stop reason: HOSPADM

## 2023-08-03 RX ORDER — HYDROCORTISONE 25 MG/G
CREAM TOPICAL AS NEEDED
Status: DISCONTINUED | OUTPATIENT
Start: 2023-08-03 | End: 2023-08-05 | Stop reason: HOSPADM

## 2023-08-03 RX ADMIN — MORPHINE SULFATE 4 MG: 4 INJECTION, SOLUTION INTRAMUSCULAR; INTRAVENOUS at 04:03

## 2023-08-03 RX ADMIN — SODIUM CHLORIDE, POTASSIUM CHLORIDE, SODIUM LACTATE AND CALCIUM CHLORIDE 1000 ML: 600; 310; 30; 20 INJECTION, SOLUTION INTRAVENOUS at 04:15

## 2023-08-03 RX ADMIN — MINERAL OIL 30 ML: 1000 SOLUTION ORAL at 10:21

## 2023-08-03 RX ADMIN — ACETAMINOPHEN 650 MG: 325 TABLET, FILM COATED ORAL at 20:56

## 2023-08-03 RX ADMIN — DOCUSATE SODIUM 100 MG: 100 CAPSULE, LIQUID FILLED ORAL at 20:56

## 2023-08-03 RX ADMIN — Medication 10 ML/HR: at 05:14

## 2023-08-03 RX ADMIN — IBUPROFEN 600 MG: 600 TABLET ORAL at 17:13

## 2023-08-03 RX ADMIN — FERROUS SULFATE TAB EC 324 MG (65 MG FE EQUIVALENT) 324 MG: 324 (65 FE) TABLET DELAYED RESPONSE at 17:13

## 2023-08-03 NOTE — L&D DELIVERY NOTE
[facility]   Vaginal Delivery Note    Patient Name: Ricky Hamilton  : 2005  MRN: [id number]    Date of Delivery: 8/3/2023     Diagnosis     Pre & Post-Delivery:  Intrauterine pregnancy at 40w3d  Labor status:      Pregnancy     (spontaneous vaginal delivery)             Problem List    Transfer to Postpartum     Review the Delivery Report for details.     Delivery     Delivery: Vaginal, Spontaneous     YOB: 2025    Time of Birth:  Gestational Age 11:01 AM   40w3d     Anesthesia: Epidural     Delivering clinician: Teresa PEARSON Foster    Forceps?   No   Vacuum? No    Shoulder dystocia present: No        Delivery narrative:  Ricky progressed to C+P and pushed effectively.  Mouth and nose bulb suctioned on perineum.  Anterior shoulder was delivered easily with gentle traction and maternal effort followed by posterior shoulder.  viable male. Infant stimulated, dried, and placed on mom's abdomen. Infant with lusty cry and spontaneous respirations. Delayed cord double clamped; cut by dad. Cord blood obtained. Placenta delivered Schultze intact with 3V cord. Pitocin 20 units IV given. 2nd degree perineal and right periurethral laceration repaired. FF @ U-1, light rubra lochia.       Infant     Findings: male  infant     Infant observations: Weight: 3016 g (6 lb 10.4 oz)   Length: 19.25  in  Observations/Comments:        Apgars: 8  @ 1 minute /    9  @ 5 minutes   Infant Name: Heladio     Placenta & Cord         Placenta delivered  Spontaneous  at   8/3/2023 11:05 AM     Cord: 3 vessels  present.   Nuchal Cord?  no   Cord blood obtained: Yes    Cord gases obtained:  No    Cord gas results: Venous:  No results found for: PHCVEN    Arterial:  No results found for: PHCART     Repair     Episiotomy: None     No    Lacerations: Yes  Laceration Information  Laceration Repaired?   Perineal: 2nd  Yes    Periurethral: right  Yes    Labial:       Sulcus:       Vaginal:       Cervical:         Suture used  for repair: 3-0 chromic gut   Estimated Blood Loss: 200 ml     Quantitative Blood Loss:          Complications     none    Disposition     Mother to Mother Baby/Postpartum  in stable condition currently.  Baby to remains with mom  in stable condition currently.    Teresa Najera CNM  08/03/23  12:59 EDT

## 2023-08-03 NOTE — NON STRESS TEST
Ricky Hamilton, a  at 40w2d with an TARAN of 2023, by Last Menstrual Period, was seen at [facility] for a nonstress test.    Chief Complaint   Patient presents with    Scheduled Induction       Patient Active Problem List   Diagnosis    Anemia    Ulcerative colitis    High risk teen pregnancy, antepartum    Maternal anemia in pregnancy, antepartum    Pregnancy       Start Time:   Stop Time:     Interpretation A  Nonstress Test Interpretation A: Reactive

## 2023-08-03 NOTE — H&P
VALERIE [facility]  Ricky Hamilton  : 2005  MRN: [id number]  [facility]: [id number]    History and Physical    Chief Complaint   Patient presents with    Scheduled Induction      Subjective   Ricky Hamilton is a 18 y.o. year old  with an Estimated Date of Delivery: 23 currently 40w3d presenting for induction of labor for elective at term per patient request. She has an epidural and is comfortable.    Risks of labor induction including prolongation of labor, increased risks for both  section and operative vaginal birth have been discussed at length.     Prenatal care has been with [facility].  It has been complicated by anemia which has been severe and she received blood transfusion. First PNV on 23 @ 9 weeks with 13 visits. Weight gain = 25 lbs.     OB History    Para Term  AB Living   1 0 0 0 0 0   SAB IAB Ectopic Molar Multiple Live Births   0 0 0 0 0 0      # Outcome Date GA Lbr Andrew/2nd Weight Sex Delivery Anes PTL Lv   1 Current              Past Medical History:   Diagnosis Date    Allergic     Anemia 18    Asthma     Resolved    Iron deficiency     Strep pharyngitis     Ulcerative colitis      Past Surgical History:   Procedure Laterality Date    NO PAST SURGERIES         Current Facility-Administered Medications:     ePHEDrine Sulfate (Pressors) 5 MG/ML injection 5 mg, 5 mg, Intravenous, Q10 Min PRN, Reinaldo, Teresa A, CNM    fentaNYL 2mcg/mL and ropivacaine 0.2% in NS epidural 100 mL, 10 mL/hr, Epidural, Continuous, Foster, Teresa A, CNM, Last Rate: 10 mL/hr at 2314, 10 mL/hr at 23 0514    hydrOXYzine (ATARAX) tablet 50 mg, 50 mg, Oral, Nightly PRN, Foster, Teresa A, CNM    lactated ringers bolus 1,000 mL, 1,000 mL, Intravenous, Once, Foster, Teresa A, CNM    lactated ringers infusion, 125 mL/hr, Intravenous, Continuous, Foster, Teresa A, CNM, Last Rate: 125 mL/hr at 23, 125 mL/hr at 23    mineral oil liquid 30 mL, 30 mL,  "Topical, Once, Zee Najeraorah A, CNM    Morphine sulfate (PF) injection 4 mg, 4 mg, Intravenous, Q4H PRN, Zee Najeraorah A, CNM, 4 mg at 08/03/23 0403    Morphine sulfate (PF) injection 6 mg, 6 mg, Intravenous, Q4H PRN, Reinaldo Teresa A, CNM    ondansetron (ZOFRAN) tablet 4 mg, 4 mg, Oral, Q6H PRN **OR** ondansetron (ZOFRAN) injection 4 mg, 4 mg, Intravenous, Q6H PRN, Reinaldo, Teresa A, CNM    ondansetron (ZOFRAN) injection 4 mg, 4 mg, Intravenous, Once PRN, Adamaris Najerah A, CNM    oxytocin (PITOCIN) 30 units in 0.9% sodium chloride 500 mL (premix), 1-20 christine-units/min, Intravenous, Titrated, Zee Najeraorah A, CNM, Last Rate: 11 mL/hr at 08/03/23 0802, 11 christine-units/min at 08/03/23 0802    promethazine (PHENERGAN) suppository 12.5 mg, 12.5 mg, Rectal, Q6H PRN **OR** promethazine (PHENERGAN) tablet 12.5 mg, 12.5 mg, Oral, Q6H PRN, Reinaldo, Teresa A, CNM    Sod Citrate-Citric Acid (BICITRA) solution 30 mL, 30 mL, Oral, Once, Adamaris Najerah A, CNM    Allergies   Allergen Reactions    Mesalamine Other (See Comments)     Other reaction(s): Hypersensitive  Pleuritis with abnormal pulmonary function tests    Penicillins Hives     Social History    Tobacco Use      Smoking status: Never      Smokeless tobacco: Never      Tobacco comments: nicotine- i vaped for about a year straight, then i quit right away when i found out i was pregnant    Review of Systems   Constitutional: Negative.    Respiratory: Negative.     Cardiovascular: Negative.    Gastrointestinal: Negative.    Genitourinary: Negative.    Musculoskeletal: Negative.    Neurological: Negative.    Psychiatric/Behavioral: Negative.     All other systems reviewed and are negative.      Objective   /70   Pulse 77   Temp 97.8 øF (36.6 øC) (Oral)   Resp 14   Ht 157.5 cm (62\")   Wt 53.2 kg (117 lb 4.8 oz)   LMP 10/24/2022   SpO2 100%   Breastfeeding No   BMI 21.45 kg/mý                                           General:  well developed; well " nourished  no acute distress   Neck:    Heart:   supple and no masses  normal apical impulse  regular rate and rhythm   Lungs: breathing is unlabored  clear to auscultation bilaterally   Abdomen: gravid  EFW: 7#, growth scan @ 40 weeks=33%, posterior placenta   FHT's: reassuring, reactive, and category 1   Cervix: was checked (by me): 4 cm / 100 % / -1, midposition, AROM clear fluid   Presentation: cephalic   Contractions: every 2-3 minutes - external monitors used Pitocin @ 11 mu   Extremities:   normal appearance with no cyanosis or edema and no calf tenderness   Skin:  Skin color, texture, turgor normal. No rashes or lesions or Skin warm and dry   Psych:  Normal. and Alert and oriented, appropriate affect.       Prenatal Labs  Lab Results   Component Value Date    HGB 10.7 (L) 08/02/2023    HEPBSAG Negative 01/04/2023    ABSCRN Negative 08/02/2023    [id number] Non Reactive 01/04/2023    HEPCVIRUSABY <0.1 01/04/2023    STREPGPB Negative 07/03/2023       Current Labs Reviewed   Lab Results (last 24 hours)       Procedure Component Value Units Date/Time    CBC & Differential [[id number]]  (Abnormal) Collected: 08/02/23 2126    Specimen: Blood Updated: 08/02/23 2151    Narrative:      The following orders were created for panel order CBC & Differential.  Procedure                               Abnormality         Status                     ---------                               -----------         ------                     CBC Auto Differential[[id number]]        Abnormal            Final result               Scan Slide[[id number]]                                       Final result                 Please view results for these tests on the individual orders.    Scan Slide [[id number]] Collected: 08/02/23 2126    Specimen: Blood Updated: 08/02/23 2151     Anisocytosis Slight/1+     WBC Morphology Normal     Platelet Estimate Adequate    CBC Auto Differential [[id number]]  (Abnormal) Collected: 08/02/23 2126    Specimen:  Blood Updated: 23     WBC 7.94 10*3/mm3      RBC 4.27 10*6/mm3      Hemoglobin 10.7 g/dL      Hematocrit 32.5 %      MCV 76.1 fL      MCH 25.1 pg      MCHC 32.9 g/dL      RDW 21.9 %      RDW-SD 59.3 fl      MPV 9.6 fL      Platelets 300 10*3/mm3      Neutrophil % 65.7 %      Lymphocyte % 25.8 %      Monocyte % 7.2 %      Eosinophil % 0.9 %      Basophil % 0.3 %      Immature Grans % 0.1 %      Neutrophils, Absolute 5.22 10*3/mm3      Lymphocytes, Absolute 2.05 10*3/mm3      Monocytes, Absolute 0.57 10*3/mm3      Eosinophils, Absolute 0.07 10*3/mm3      Basophils, Absolute 0.02 10*3/mm3      Immature Grans, Absolute 0.01 10*3/mm3      nRBC 0.0 /100 WBC     POC Urinalysis Dipstick [[id number]]  (Abnormal) Collected: 23    Specimen: Urine Updated: 23     Color Yellow     Clarity, UA Clear     Glucose, UA Negative mg/dL      Bilirubin Negative     Ketones, UA Negative     Specific Gravity  1.025     Blood, UA Negative     pH, Urine 6.0     Protein, POC 1+ mg/dL      Urobilinogen, UA Normal     Leukocytes Trace     Nitrite, UA Negative               ASSESSMENT  IUP at 40w3d  Induction of labor because of elective at term per patient request  Group B strep status: negative  Hx Crohns disease  Hx severe anemia  Reactive NST    PLAN  Admit to   Low dose Pitocin induction and increase per protocol @ [facility]  All procedures explained to patient and family. Questions answered and verbalized understanding.  Anticipate ARTEM Najera, APRN  8/3/2023  08:11 EDT

## 2023-08-03 NOTE — ANESTHESIA PREPROCEDURE EVALUATION
Anesthesia Evaluation     Patient summary reviewed and Nursing notes reviewed   NPO Solid Status: > 8 hours  NPO Liquid Status: > 8 hours           Airway   Mallampati: II  TM distance: >3 FB  Neck ROM: full  No difficulty expected  Dental - normal exam     Pulmonary - normal exam   (+) asthma,recent URI  (-) not a smoker  Cardiovascular - negative cardio ROS and normal exam  Exercise tolerance: good (4-7 METS)        Neuro/Psych- negative ROS  GI/Hepatic/Renal/Endo - negative ROS     Musculoskeletal (-) negative ROS    Abdominal    Substance History - negative use     OB/GYN    (+) Pregnant        Other   blood dyscrasia (during pregnancy) anemia,     ROS/Med Hx Other: Plt 300                  Anesthesia Plan    ASA 2     epidural     (Risk and benefits discussed, discussed risk of nausea, vomiting, itching, low blood pressure, post-procedural back pain, PDPH, and infection. Patient reports understanding. Continue with POCRisk and benefits discussed, discussed risk of nausea, vomiting, itching, low blood pressure, post-procedural back pain, PDPH, and infection. Patient reports understanding. Continue with POC)    Anesthetic plan, risks, benefits, and alternatives have been provided, discussed and informed consent has been obtained with: patient.  Pre-procedure education provided    CODE STATUS:    Code Status (Patient has no pulse and is not breathing): CPR (Attempt to Resuscitate)  Medical Interventions (Patient has pulse or is breathing): Full  Release to patient: Routine Release

## 2023-08-03 NOTE — ANESTHESIA PROCEDURE NOTES
Labor Epidural      Patient reassessed immediately prior to procedure    Patient location during procedure: OB  Start Time: 8/3/2023 4:58 AM  Stop Time: 8/3/2023 5:12 AM  Performed By  CRNA/CAA: Dontae Miranda CRNA  Preanesthetic Checklist  Completed: patient identified, IV checked, site marked, risks and benefits discussed, surgical consent, monitors and equipment checked, pre-op evaluation and timeout performed  Additional Notes  Risks discussed , including but not limited to:  Headache, itching, n&v, infection, failure, decreased blood pressure, permanent chronic/back pain, nerve damage, paralysis, etc. All questions answered and informed consent obtained. Skin localized with lidocaine skin wheel. Test dose 2+3 ml of 1.5% lidocaine with 1:200,000 epi.  Prep:  Pt Position:sitting  Sterile Tech:cap, gloves, mask and sterile barrier  Prep:chlorhexidine gluconate and isopropyl alcohol  Monitoring:blood pressure monitoring and continuous pulse oximetry  Epidural Block Procedure:  Approach:midline  Guidance:landmark technique and palpation technique  Location:L3-L4  Needle Type:Tuohy  Needle Gauge:18 G  Loss of Resistance Medium: air  Loss of Resistance: 5cm  Cath Depth at skin:11 cm  Paresthesia: transient and left  Aspiration:negative  Test Dose:negative  Number of Attempts: 1  Post Assessment:  Dressing:occlusive dressing applied and secured with tape  Pt Tolerance:patient tolerated the procedure well with no apparent complications  Complications:no

## 2023-08-03 NOTE — PLAN OF CARE
Goal Outcome Evaluation:  Plan of Care Reviewed With: patient, spouse        Progress: improving  Outcome Evaluation: VSS. I & O adequate. Patient reports soreness in perineum but has declined medication intervention. Positive bonding observed with . Bottlefeeding infant.

## 2023-08-04 LAB
ANISOCYTOSIS BLD QL: NORMAL
BASOPHILS # BLD AUTO: 0.03 10*3/MM3 (ref 0–0.2)
BASOPHILS NFR BLD AUTO: 0.4 % (ref 0–1.5)
DEPRECATED RDW RBC AUTO: 61.2 FL (ref 37–54)
EOSINOPHIL # BLD AUTO: 0.11 10*3/MM3 (ref 0–0.4)
EOSINOPHIL NFR BLD AUTO: 1.4 % (ref 0.3–6.2)
ERYTHROCYTE [DISTWIDTH] IN BLOOD BY AUTOMATED COUNT: 21.8 % (ref 12.3–15.4)
HCT VFR BLD AUTO: 31.3 % (ref 34–46.6)
HGB BLD-MCNC: 10 G/DL (ref 12–15.9)
IMM GRANULOCYTES # BLD AUTO: 0.03 10*3/MM3 (ref 0–0.05)
IMM GRANULOCYTES NFR BLD AUTO: 0.4 % (ref 0–0.5)
LYMPHOCYTES # BLD AUTO: 1.51 10*3/MM3 (ref 0.7–3.1)
LYMPHOCYTES NFR BLD AUTO: 18.7 % (ref 19.6–45.3)
MCH RBC QN AUTO: 24.8 PG (ref 26.6–33)
MCHC RBC AUTO-ENTMCNC: 31.9 G/DL (ref 31.5–35.7)
MCV RBC AUTO: 77.5 FL (ref 79–97)
MONOCYTES # BLD AUTO: 0.71 10*3/MM3 (ref 0.1–0.9)
MONOCYTES NFR BLD AUTO: 8.8 % (ref 5–12)
NEUTROPHILS NFR BLD AUTO: 5.67 10*3/MM3 (ref 1.7–7)
NEUTROPHILS NFR BLD AUTO: 70.3 % (ref 42.7–76)
NRBC BLD AUTO-RTO: 0 /100 WBC (ref 0–0.2)
PLATELET # BLD AUTO: 289 10*3/MM3 (ref 140–450)
PMV BLD AUTO: 9.8 FL (ref 6–12)
RBC # BLD AUTO: 4.04 10*6/MM3 (ref 3.77–5.28)
SMALL PLATELETS BLD QL SMEAR: ADEQUATE
WBC MORPH BLD: NORMAL
WBC NRBC COR # BLD: 8.06 10*3/MM3 (ref 3.4–10.8)

## 2023-08-04 PROCEDURE — 85007 BL SMEAR W/DIFF WBC COUNT: CPT | Performed by: MIDWIFE

## 2023-08-04 PROCEDURE — 85025 COMPLETE CBC W/AUTO DIFF WBC: CPT | Performed by: MIDWIFE

## 2023-08-04 RX ADMIN — HYDROCODONE BITARTRATE AND ACETAMINOPHEN 1 TABLET: 10; 325 TABLET ORAL at 19:15

## 2023-08-04 RX ADMIN — FERROUS SULFATE TAB EC 324 MG (65 MG FE EQUIVALENT) 324 MG: 324 (65 FE) TABLET DELAYED RESPONSE at 08:08

## 2023-08-04 RX ADMIN — PRENATAL VITAMINS-IRON FUMARATE 27 MG IRON-FOLIC ACID 0.8 MG TABLET 1 TABLET: at 08:08

## 2023-08-04 RX ADMIN — Medication: at 04:07

## 2023-08-04 RX ADMIN — FERROUS SULFATE TAB EC 324 MG (65 MG FE EQUIVALENT) 324 MG: 324 (65 FE) TABLET DELAYED RESPONSE at 19:11

## 2023-08-04 RX ADMIN — IBUPROFEN 600 MG: 600 TABLET ORAL at 04:07

## 2023-08-04 RX ADMIN — DOCUSATE SODIUM 100 MG: 100 CAPSULE, LIQUID FILLED ORAL at 08:08

## 2023-08-04 NOTE — PROGRESS NOTES
"Patient: Ricky Hamilton  * No surgery found *  Anesthesia type: Anesthesia type cannot be found on the log.    Patient location: Labor and Delivery  Last vitals: /70 (BP Location: Left arm, Patient Position: Sitting)   Pulse 75   Temp 97.6 øF (36.4 øC) (Oral)   Resp 16   Ht 157.5 cm (62\")   Wt 53.2 kg (117 lb 4.8 oz)   LMP 10/24/2022   SpO2 97%   Breastfeeding No   BMI 21.45 kg/mý   Level of consciousness: awake, alert, and oriented    Post-anesthesia pain: adequate analgesia  Airway patency: patent  Respiratory: unassisted  Cardiovascular: stable and blood pressure at baseline  Hydration: euvolemic    Anesthetic complications: no      "

## 2023-08-04 NOTE — PROGRESS NOTES
Mike Hamilton  : 2005  MRN: [id number]  CSN: [id number]    Postpartum Day #1  Subjective   Her pain is well controlled.  Vaginal bleeding is appropriate amount. She is voiding without difficulty. She is bottle feeding.     Objective     Min/max vitals past 24 hours:   Temp  Min: 97.6 øF (36.4 øC)  Max: 98.6 øF (37 øC)  BP  Min: 105/56  Max: 119/72  Pulse  Min: 69  Max: 92  Resp  Min: 16  Max: 18        General: well developed; well nourished  no acute distress   Abdomen: fundus firm and non-tender   Pelvic: Not performed   Ext: Calves NT     Lab Results   Component Value Date    WBC 8.06 2023    HGB 10.0 (L) 2023    HCT 31.3 (L) 2023    MCV 77.5 (L) 2023     2023    ABO A 2023    RH Positive 2023    RUBELLAABIGG 1.64 2023    HEPBSAG Negative 2023        Assessment   Postpartum Day #1 S/P vaginal delivery  Anemia     Plan   Continue routine postpartum care    This note was electronically signed  Teresa Najera, ALISIA  2023  08:44 EDT

## 2023-08-04 NOTE — PLAN OF CARE
Goal Outcome Evaluation:         Ongoing Progressing:    Pt VSS, I&O WNL, pain controlled with PO medications, rested well, bonding with baby, formula feeding with some assistance with getting baby to wake up and latch onto bottle overnight. Lochia scant, fundus is 1cm below umbilicus. Mild pain with laceration but controlled with dermoplast, ice packs, TUCS, and PO medications. Pt will get a sitz bath today. Stable and will continue to monitor.

## 2023-08-05 VITALS
OXYGEN SATURATION: 97 % | BODY MASS INDEX: 21.59 KG/M2 | HEART RATE: 79 BPM | RESPIRATION RATE: 17 BRPM | HEIGHT: 62 IN | DIASTOLIC BLOOD PRESSURE: 76 MMHG | SYSTOLIC BLOOD PRESSURE: 113 MMHG | TEMPERATURE: 98 F | WEIGHT: 117.3 LBS

## 2023-08-05 PROCEDURE — 0503F POSTPARTUM CARE VISIT: CPT | Performed by: OBSTETRICS & GYNECOLOGY

## 2023-08-05 RX ORDER — HYDROCODONE BITARTRATE AND ACETAMINOPHEN 5; 325 MG/1; MG/1
1 TABLET ORAL EVERY 6 HOURS PRN
Qty: 12 TABLET | Refills: 0 | Status: SHIPPED | OUTPATIENT
Start: 2023-08-05

## 2023-08-05 RX ORDER — IBUPROFEN 600 MG/1
600 TABLET ORAL EVERY 6 HOURS PRN
Qty: 60 TABLET | Refills: 0 | Status: SHIPPED | OUTPATIENT
Start: 2023-08-05

## 2023-08-05 RX ORDER — FERROUS SULFATE 325(65) MG
325 TABLET ORAL
Qty: 60 TABLET | Refills: 0 | Status: SHIPPED | OUTPATIENT
Start: 2023-08-05

## 2023-08-05 RX ORDER — DOCUSATE SODIUM 100 MG/1
100 CAPSULE, LIQUID FILLED ORAL 2 TIMES DAILY
Qty: 60 CAPSULE | Refills: 0 | Status: SHIPPED | OUTPATIENT
Start: 2023-08-05

## 2023-08-05 RX ADMIN — IBUPROFEN 600 MG: 600 TABLET ORAL at 04:55

## 2023-08-05 RX ADMIN — DOCUSATE SODIUM 100 MG: 100 CAPSULE, LIQUID FILLED ORAL at 07:43

## 2023-08-05 RX ADMIN — FERROUS SULFATE TAB EC 324 MG (65 MG FE EQUIVALENT) 324 MG: 324 (65 FE) TABLET DELAYED RESPONSE at 07:43

## 2023-08-05 RX ADMIN — PRENATAL VITAMINS-IRON FUMARATE 27 MG IRON-FOLIC ACID 0.8 MG TABLET 1 TABLET: at 07:43

## 2023-08-05 NOTE — PLAN OF CARE
Goal Outcome Evaluation:              Outcome Evaluation: VSS, adequate pain relief,bottlefeeding well, + bonding, anticipating discharge

## 2023-08-05 NOTE — DISCHARGE SUMMARY
Discharge Summary     Mike Hamilton  : 2005  MRN: [id number]  CSN: [id number]    Date of Admission: 2023   Date of Discharge:  2023   Delivering Physician: Teresa PEARSON Foster        Admission Diagnosis: Pregnancy [Z34.90]  History of Crohn's disease  History of severe anemia   Discharge Diagnosis: Same as above plus  Pregnancy at 40w3d - delivered  Anemia       Procedures: 8/3/2023  - Vaginal, Spontaneous       Hospital Course  Patient is a 18 y.o.  who at 40w3d had a vaginal birth.  Her postpartum course was without complications.  On PPD #2 she was ready for discharge.  She had normal lochia and pain was well controlled with oral medications.  Patient denied any dizziness or lightheadedness.  She is requesting discharged home.  Discharge instructions and precautions were given.    Vitals  Min/max vitals past 24 hours:   Temp  Min: 97.6 øF (36.4 øC)  Max: 98.1 øF (36.7 øC)  BP  Min: 100/63  Max: 114/70  Pulse  Min: 74  Max: 75  Resp  Min: 16  Max: 18    Fluid balance past 24 hours:  I/O last 3 completed shifts:  In: 240 [P.O.:240]  Out: 400 [Urine:400]           Physical Exam  General Appearance: well developed, well nourished, not in any acute distress   Respiratory: breathing is unlabored, clear to auscultation bilaterally   CV: regular rate and rhythm, S1, S2 normal, no murmur, click, rub or gallop   Abdomen: soft, non-tender, no palpable masses; fundus firm and non-tender   Pelvic: not performed   Extremities: moves extremities well; normal appearance with no cyanosis or edema and no calf tenderness     Infant  male  fetus weighing 3016 g (6 lb 10.4 oz)   Apgars -  8 @ 1 minute /  9 @ 5 minutes.    Discharge labs  Lab Results   Component Value Date    WBC 8.06 2023    HGB 10.0 (L) 2023    HCT 31.3 (L) 2023     2023       Discharge Medications     Discharge Medications        New Medications        Instructions Start Date   docusate sodium 100 MG  capsule  Commonly known as: Colace   100 mg, Oral, 2 Times Daily      HYDROcodone-acetaminophen 5-325 MG per tablet  Commonly known as: NORCO   1 tablet, Oral, Every 6 Hours PRN      ibuprofen 600 MG tablet  Commonly known as: ADVIL,MOTRIN   600 mg, Oral, Every 6 Hours PRN             Changes to Medications        Instructions Start Date   ferrous sulfate 325 (65 FE) MG tablet  What changed: when to take this   325 mg, Oral, 2 Times Daily Before Meals             Continue These Medications        Instructions Start Date   ENTYVIO IV   Intravenous      PNV Prenatal Plus Multivitamin 27-1 MG tablet   1 tablet, Oral, Daily      PROBIOTIC-10 PO   Oral               Discharge Disposition: Home   Condition on Discharge: Stable   Follow-up: 6 weeks with [facility]   Time spent: 20  minutes  Kathi Ndiaye M.D.  8/5/2023